# Patient Record
Sex: FEMALE | Race: BLACK OR AFRICAN AMERICAN | Employment: UNEMPLOYED | URBAN - METROPOLITAN AREA
[De-identification: names, ages, dates, MRNs, and addresses within clinical notes are randomized per-mention and may not be internally consistent; named-entity substitution may affect disease eponyms.]

---

## 2017-09-19 ENCOUNTER — OFFICE VISIT (OUTPATIENT)
Dept: INTERNAL MEDICINE CLINIC | Age: 47
End: 2017-09-19

## 2017-09-19 VITALS
DIASTOLIC BLOOD PRESSURE: 91 MMHG | WEIGHT: 124.2 LBS | HEART RATE: 94 BPM | TEMPERATURE: 98.5 F | RESPIRATION RATE: 18 BRPM | SYSTOLIC BLOOD PRESSURE: 115 MMHG | BODY MASS INDEX: 19.49 KG/M2 | OXYGEN SATURATION: 97 % | HEIGHT: 67 IN

## 2017-09-19 DIAGNOSIS — R26.9 GAIT DISORDER: Primary | ICD-10-CM

## 2017-09-19 DIAGNOSIS — M25.50 ARTHRALGIA, UNSPECIFIED JOINT: ICD-10-CM

## 2017-09-19 DIAGNOSIS — R63.4 WEIGHT LOSS: ICD-10-CM

## 2017-09-19 NOTE — PROGRESS NOTES
1. Have you been to the ER, urgent care clinic since your last visit? Hospitalized since your last visit? Yes Reason for visit: right knee    2. Have you seen or consulted any other health care providers outside of the [address] [place] [address] since your last visit? Include any pap smears or colon screening.  Yes Where: [facility]

## 2017-09-19 NOTE — MR AVS SNAPSHOT
Visit Information Date & Time Provider Department Dept. Phone Encounter #  
 9/19/2017  4:00 PM Juan Manuel Hawkins MD SPORTS MED AND PRIMARY CARE - Enocia Marker [phone] [id number] Upcoming Health Maintenance Date Due  
 PAP AKA CERVICAL CYTOLOGY 7/21/2018 DTaP/Tdap/Td series (2 - Td) 9/19/2027 Allergies as of 9/19/2017  Review Complete On: 9/19/2017 By: Juan Manuel Hawkins MD  
 No Known Allergies Current Immunizations  Reviewed on 1/6/2015 Name Date Influenza Vaccine 10/6/2014 Not reviewed this visit You Were Diagnosed With   
  
 Codes Comments Gait disorder    -  Primary ICD-10-CM: R26.9 ICD-9-CM: 032. 2 Weight loss     ICD-10-CM: R63.4 ICD-9-CM: 783.21 Arthralgia, unspecified joint     ICD-10-CM: M25.50 ICD-9-CM: 719.40 Vitals BP Pulse Temp Resp Height(growth percentile) Weight(growth percentile) (!) 115/91 (BP 1 Location: Left arm, BP Patient Position: Sitting) 94 98.5 °F (36.9 °C) (Oral) 18 5' 7\" (1.702 m) 124 lb 3.2 oz (56.3 kg) SpO2 BMI OB Status Smoking Status 97% 19.45 kg/m2 Injection Never Smoker Vitals History BMI and BSA Data Body Mass Index Body Surface Area  
 19.45 kg/m 2 1.63 m 2 Preferred Pharmacy Pharmacy Name Phone [id number] [phone] Your Updated Medication List  
  
   
This list is accurate as of: 9/19/17  4:41 PM.  Always use your most recent med list.  
  
  
  
  
 * benztropine 1 mg tablet Commonly known as:  COGENTIN Take 1 mg by mouth nightly. * benztropine 0.5 mg tablet Commonly known as:  COGENTIN Take 1 mg by mouth nightly as needed (Q HS). diazePAM 10 mg tablet Commonly known as:  VALIUM Take tab 45 minutes prior to MRI. Must have . * haloperidol 2 mg tablet Commonly known as:  HALDOL Take  by mouth nightly.  2 tabs qhs  
  
 * haloperidol 0.5 mg tablet Commonly known as:  HALDOL Take 2 mg by mouth nightly as needed (Q HS). * DEPO-PROVERA 150 mg/mL Syrg Generic drug:  medroxyPROGESTERone 150 mg by IntraMUSCular route once. Indications: EVERY 3 MONTHS * medroxyPROGESTERone 150 mg/mL Syrg Commonly known as:  DEPO-PROVERA * topiramate 50 mg tablet Commonly known as:  TOPAMAX Take 50 mg by mouth nightly. * TOPAMAX 50 mg tablet Generic drug:  topiramate Take 50 mg by mouth nightly as needed (Q HS). * traZODone 50 mg tablet Commonly known as:  Joy Hand Take  by mouth nightly. * traZODone 50 mg tablet Commonly known as:  Joy Hand Take 50 mg by mouth nightly. * Notice: This list has 10 medication(s) that are the same as other medications prescribed for you. Read the directions carefully, and ask your doctor or other care provider to review them with you. We Performed the Following CBC WITH AUTOMATED DIFF [62431 CPT(R)] HEMOGLOBIN A1C WITH EAG [76789 CPT(R)] LIPID PANEL [12876 CPT(R)] METABOLIC PANEL, COMPREHENSIVE [15901 CPT(R)] MA COLLECTION VENOUS BLOOD,VENIPUNCTURE [id number] CPT(R)] TSH 3RD GENERATION [64058 CPT(R)] URINALYSIS W/ RFLX MICROSCOPIC [59405 CPT(R)] Introducing \[facility]\"" & HEALTH SERVICES! [facility] introduces [employer] patient portal. Now you can access parts of your medical record, email your doctor's office, and request medication refills online. 1. In your internet browser, go to [email or web address] 2. Click on the First Time User? Click Here link in the Sign In box. You will see the New Member Sign Up page. 3. Enter your [employer] Access Code exactly as it appears below. You will not need to use this code after youve completed the sign-up process. If you do not sign up before the expiration date, you must request a new code. · [employer] Access Code: [email or web address] Expires: 12/18/2017  4:41 PM 
 
 4. Enter the last four digits of your Social Security Number (xxxx) and Date of Birth (mm/dd/yyyy) as indicated and click Submit. You will be taken to the next sign-up page. 5. Create a Quantitative Medicine ID. This will be your Quantitative Medicine login ID and cannot be changed, so think of one that is secure and easy to remember. 6. Create a Quantitative Medicine password. You can change your password at any time. 7. Enter your Password Reset Question and Answer. This can be used at a later time if you forget your password. 8. Enter your e-mail address. You will receive e-mail notification when new information is available in [address] 9. Click Sign Up. You can now view and download portions of your medical record. 10. Click the Download Summary menu link to download a portable copy of your medical information. If you have questions, please visit the Frequently Asked Questions section of the Quantitative [employer] website. Remember, [employer] is NOT to be used for urgent needs. For medical emergencies, dial 911. Now available from your iPhone and Android! Please provide this summary of care documentation to your next provider. Your primary care clinician is listed as Mario Alaniz. If you have any questions after today's visit, please call [phone].

## 2017-09-19 NOTE — PROGRESS NOTES
SPORTS MEDICINE AND PRIMARY CARE  Agueda Ayala MD, [address] [address]  Phone:  [phone]  Fax: [phone]      Chief Complaint   Patient presents with    Annual Exam         SUBECTIVE:    Doreen Ybarra is a 55 y.o. female Patient returns today alert, appropriate and has poor capacity to give an accurate history . She has a known history of Gait disorder, nystagmus, schizophrenia, bipolar, seizure disorder. She is seen for evaluation. Her caregiver, Tiburcio Peres, comes in with her today. There are no new concerns. Current Outpatient Prescriptions   Medication Sig Dispense Refill    medroxyPROGESTERone (DEPO-PROVERA) 150 mg/mL syrg 150 mg by IntraMUSCular route once. Indications: EVERY 3 MONTHS      haloperidol (HALDOL) 0.5 mg tablet Take 2 mg by mouth nightly as needed (Q HS).  traZODone (DESYREL) 50 mg tablet Take 50 mg by mouth nightly.  benztropine (COGENTIN) 0.5 mg tablet Take 1 mg by mouth nightly as needed (Q HS).  topiramate (TOPAMAX) 50 mg tablet Take 50 mg by mouth nightly as needed (Q HS).  medroxyPROGESTERone (DEPO-PROVERA) 150 mg/mL syrg       haloperidol (HALDOL) 2 mg tablet Take  by mouth nightly. 2 tabs qhs      traZODone (DESYREL) 50 mg tablet Take  by mouth nightly.  benztropine (COGENTIN) 1 mg tablet Take 1 mg by mouth nightly.  topiramate (TOPAMAX) 50 mg tablet Take 50 mg by mouth nightly.  diazepam (VALIUM) 10 mg tablet Take tab 45 minutes prior to MRI. Must have .  1 Tab 0       Past Medical History:   Diagnosis Date    Dental caries     FH: mental illness     Gait disorder     Nystagmus     primary gaze nystagmus    Psychiatric disorder     Schizophrenia  - Bipolar    Psychiatric disorder     Anxiety    Schizophrenia ([address]     Schizophrenia ([address]     undifferentiated    Seizures ([address]      Past Surgical History:   Procedure Laterality Date    HX OTHER SURGICAL      Dental Rehabilitation     No Known Allergies    REVIEW OF SYSTEMS:   Weight loss  concerned about weight gain. Social History     Social History    Marital status: SINGLE     Spouse name: N/A    Number of children: N/A    Years of education: N/A     Social History Main Topics    Smoking status: Never Smoker    Smokeless tobacco: Never Used    Alcohol use No    Drug use: No    Sexual activity: Not Asked     Other Topics Concern    None     Social History Narrative    ** Merged History Encounter **        r  Family History   Problem Relation Age of Onset    Heart Disease Mother     Heart Attack Mother     Hypertension Sister     Diabetes Sister        OBJECTIVE:  Visit Vitals    BP (!) 115/91 (BP 1 Location: Left arm, BP Patient Position: Sitting)    Pulse 94    Temp 98.5 °F (36.9 °C) (Oral)    Resp 18    Ht 5' 7\" (1.702 m)    Wt 124 lb 3.2 oz (56.3 kg)    SpO2 97%    BMI 19.45 kg/m2     ENT: perrla,  eom intact  NECK: supple. Thyroid normal  CHEST: clear to ascultation and percussion   HEART: regular rate and rhythm  ABD: soft, bowel sounds active  EXTREMITIES: no edema, pulse 1+     No visits with results within 3 Month(s) from this visit. Latest known visit with results is:    Office Visit on 06/23/2016   Component Date Value Ref Range Status    WBC 06/23/2016 6.0  3.4 - 10.8 x10E3/uL Final    RBC 06/23/2016 4.21  3.77 - 5.28 x10E6/uL Final    HGB 06/23/2016 13.5  11.1 - 15.9 g/dL Final    HCT 06/23/2016 38.8  34.0 - 46.6 % Final    MCV 06/23/2016 92  79 - 97 fL Final    MCH 06/23/2016 32.1  26.6 - 33.0 pg Final    MCHC 06/23/2016 34.8  31.5 - 35.7 g/dL Final    RDW 06/23/2016 13.3  12.3 - 15.4 % Final    PLATELET 32/57/3868 631  150 - 379 x10E3/uL Final    NEUTROPHILS 06/23/2016 42  % Final    Lymphocytes 06/23/2016 47  % Final    MONOCYTES 06/23/2016 8  % Final    EOSINOPHILS 06/23/2016 2  % Final    BASOPHILS 06/23/2016 1  % Final    ABS.  NEUTROPHILS 06/23/2016 2.5  1.4 - 7.0 x10E3/uL Final    Abs Lymphocytes 06/23/2016 2.9  0.7 - 3.1 x10E3/uL Final    ABS. MONOCYTES 06/23/2016 0.5  0.1 - 0.9 x10E3/uL Final    ABS. EOSINOPHILS 06/23/2016 0.1  0.0 - 0.4 x10E3/uL Final    ABS. BASOPHILS 06/23/2016 0.0  0.0 - 0.2 x10E3/uL Final    IMMATURE GRANULOCYTES 06/23/2016 0  % Final    ABS. IMM. GRANS. 06/23/2016 0.0  0.0 - 0.1 x10E3/uL Final    Glucose 06/23/2016 78  65 - 99 mg/dL Final    BUN 06/23/2016 8  6 - 24 mg/dL Final    Creatinine 06/23/2016 0.67  0.57 - 1.00 mg/dL Final    GFR est non-AA 06/23/2016 106  >59 mL/min/1.73 Final    GFR est AA 06/23/2016 123  >59 mL/min/1.73 Final    BUN/Creatinine ratio 06/23/2016 12  9 - 23 Final    Sodium 06/23/2016 140  134 - 144 mmol/L Final    Potassium 06/23/2016 4.4  3.5 - 5.2 mmol/L Final    Chloride 06/23/2016 103  97 - 108 mmol/L Final    CO2 06/23/2016 26  18 - 29 mmol/L Final    Calcium 06/23/2016 9.6  8.7 - 10.2 mg/dL Final    Protein, total 06/23/2016 7.5  6.0 - 8.5 g/dL Final    Albumin 06/23/2016 4.5  3.5 - 5.5 g/dL Final    GLOBULIN, TOTAL 06/23/2016 3.0  1.5 - 4.5 g/dL Final    A-G Ratio 06/23/2016 1.5  1.1 - 2.5 Final    Bilirubin, total 06/23/2016 0.3  0.0 - 1.2 mg/dL Final    Alk. phosphatase 06/23/2016 60  39 - 117 IU/L Final    AST (SGOT) 06/23/2016 21  0 - 40 IU/L Final    ALT (SGPT) 06/23/2016 16  0 - 32 IU/L Final    Cholesterol, total 06/23/2016 194  100 - 199 mg/dL Final    Triglyceride 06/23/2016 132  0 - 149 mg/dL Final    HDL Cholesterol 06/23/2016 44  >39 mg/dL Final    Comment: According to ATP-III Guidelines, HDL-C >59 mg/dL is considered a  negative risk factor for CHD.       VLDL, calculated 06/23/2016 26  5 - 40 mg/dL Final    LDL, calculated 06/23/2016 124* 0 - 99 mg/dL Final    TSH 06/23/2016 0.814  0.450 - 4.500 uIU/mL Final    Hemoglobin A1c 06/23/2016 5.3  4.8 - 5.6 % Final    Comment:          Pre-diabetes: 5.7 - 6.4           Diabetes: >6.4           Glycemic control for adults with diabetes: <7.0      Estimated average glucose 06/23/2016 105  mg/dL Final          ASSESSMENT:  1. Gait disorder    2. Weight loss    3. Arthralgia, unspecified joint      Little change in her condition since we last saw her. We were previously concerned about weight loss. That seems to have resolved. She's gained six pounds since we last saw her. Gait disorder has not changed. She has no arthralgias and specifically denies joint pain. Appropriate laboratory studies will be requested. She'll return to see us in one year, sooner if she has any problems. PLAN:  .  Orders Placed This Encounter    URINALYSIS W/ RFLX MICROSCOPIC    CBC WITH AUTOMATED DIFF    METABOLIC PANEL, COMPREHENSIVE    LIPID PANEL    TSH 3RD GENERATION    HEMOGLOBIN A1C WITH EAG       Follow-up Disposition:  Return in about 1 year (around 9/19/2018). ATTENTION:   This medical record was transcribed using an electronic medical records system. Although proofread, it may and can contain electronic and spelling errors. Other human spelling and other errors may be present. Corrections may be executed at a later time. Please feel free to contact us for any clarifications as needed.

## 2017-09-20 LAB
ALBUMIN SERPL-MCNC: 4.2 G/DL (ref 3.5–5.5)
ALBUMIN/GLOB SERPL: 1.2 {RATIO} (ref 1.2–2.2)
ALP SERPL-CCNC: 63 IU/L (ref 39–117)
ALT SERPL-CCNC: 14 IU/L (ref 0–32)
APPEARANCE UR: ABNORMAL
AST SERPL-CCNC: 15 IU/L (ref 0–40)
BACTERIA #/AREA URNS HPF: ABNORMAL /[HPF]
BASOPHILS # BLD AUTO: 0 X10E3/UL (ref 0–0.2)
BASOPHILS NFR BLD AUTO: 1 %
BILIRUB SERPL-MCNC: 0.2 MG/DL (ref 0–1.2)
BILIRUB UR QL STRIP: NEGATIVE
BUN SERPL-MCNC: 10 MG/DL (ref 6–24)
BUN/CREAT SERPL: 16 (ref 9–23)
CALCIUM SERPL-MCNC: 9.9 MG/DL (ref 8.7–10.2)
CASTS URNS QL MICRO: ABNORMAL /LPF
CHLORIDE SERPL-SCNC: 103 MMOL/L (ref 96–106)
CHOLEST SERPL-MCNC: 185 MG/DL (ref 100–199)
CO2 SERPL-SCNC: 25 MMOL/L (ref 18–29)
COLOR UR: YELLOW
CREAT SERPL-MCNC: 0.63 MG/DL (ref 0.57–1)
EOSINOPHIL # BLD AUTO: 0.1 X10E3/UL (ref 0–0.4)
EOSINOPHIL NFR BLD AUTO: 1 %
EPI CELLS #/AREA URNS HPF: ABNORMAL /HPF
ERYTHROCYTE [DISTWIDTH] IN BLOOD BY AUTOMATED COUNT: 13.5 % (ref 12.3–15.4)
EST. AVERAGE GLUCOSE BLD GHB EST-MCNC: 91 MG/DL
GLOBULIN SER CALC-MCNC: 3.5 G/DL (ref 1.5–4.5)
GLUCOSE SERPL-MCNC: 98 MG/DL (ref 65–99)
GLUCOSE UR QL: NEGATIVE
HBA1C MFR BLD: 4.8 % (ref 4.8–5.6)
HCT VFR BLD AUTO: 39.8 % (ref 34–46.6)
HDLC SERPL-MCNC: 36 MG/DL
HGB BLD-MCNC: 13.2 G/DL (ref 11.1–15.9)
HGB UR QL STRIP: NEGATIVE
IMM GRANULOCYTES # BLD: 0 X10E3/UL (ref 0–0.1)
IMM GRANULOCYTES NFR BLD: 0 %
KETONES UR QL STRIP: NEGATIVE
LDLC SERPL CALC-MCNC: 112 MG/DL (ref 0–99)
LEUKOCYTE ESTERASE UR QL STRIP: ABNORMAL
LYMPHOCYTES # BLD AUTO: 3.7 X10E3/UL (ref 0.7–3.1)
LYMPHOCYTES NFR BLD AUTO: 56 %
MCH RBC QN AUTO: 31.7 PG (ref 26.6–33)
MCHC RBC AUTO-ENTMCNC: 33.2 G/DL (ref 31.5–35.7)
MCV RBC AUTO: 95 FL (ref 79–97)
MICRO URNS: ABNORMAL
MONOCYTES # BLD AUTO: 0.8 X10E3/UL (ref 0.1–0.9)
MONOCYTES NFR BLD AUTO: 11 %
MUCOUS THREADS URNS QL MICRO: PRESENT
NEUTROPHILS # BLD AUTO: 2 X10E3/UL (ref 1.4–7)
NEUTROPHILS NFR BLD AUTO: 31 %
NITRITE UR QL STRIP: NEGATIVE
PH UR STRIP: 6.5 [PH] (ref 5–7.5)
PLATELET # BLD AUTO: 236 X10E3/UL (ref 150–379)
POTASSIUM SERPL-SCNC: 4.2 MMOL/L (ref 3.5–5.2)
PROT SERPL-MCNC: 7.7 G/DL (ref 6–8.5)
PROT UR QL STRIP: NEGATIVE
RBC # BLD AUTO: 4.17 X10E6/UL (ref 3.77–5.28)
RBC #/AREA URNS HPF: ABNORMAL /HPF
SODIUM SERPL-SCNC: 142 MMOL/L (ref 134–144)
SP GR UR: 1.01 (ref 1–1.03)
TRIGL SERPL-MCNC: 187 MG/DL (ref 0–149)
TSH SERPL DL<=0.005 MIU/L-ACNC: 1.22 UIU/ML (ref 0.45–4.5)
UROBILINOGEN UR STRIP-MCNC: 1 MG/DL (ref 0.2–1)
VLDLC SERPL CALC-MCNC: 37 MG/DL (ref 5–40)
WBC # BLD AUTO: 6.7 X10E3/UL (ref 3.4–10.8)
WBC #/AREA URNS HPF: ABNORMAL /HPF

## 2018-05-31 ENCOUNTER — OFFICE VISIT (OUTPATIENT)
Dept: INTERNAL MEDICINE CLINIC | Age: 48
End: 2018-05-31

## 2018-05-31 VITALS
OXYGEN SATURATION: 96 % | DIASTOLIC BLOOD PRESSURE: 82 MMHG | TEMPERATURE: 97.5 F | RESPIRATION RATE: 20 BRPM | HEART RATE: 98 BPM | SYSTOLIC BLOOD PRESSURE: 112 MMHG | WEIGHT: 124.9 LBS | HEIGHT: 67 IN | BODY MASS INDEX: 19.6 KG/M2

## 2018-05-31 DIAGNOSIS — F79 INTELLECTUAL DISABILITY: ICD-10-CM

## 2018-05-31 DIAGNOSIS — R26.9 GAIT DISORDER: ICD-10-CM

## 2018-05-31 DIAGNOSIS — F20.9 SCHIZOPHRENIA, UNSPECIFIED TYPE (HCC): ICD-10-CM

## 2018-05-31 DIAGNOSIS — H55.00 NYSTAGMUS: Primary | ICD-10-CM

## 2018-05-31 NOTE — PROGRESS NOTES
SPORTS MEDICINE AND PRIMARY CARE  Apolonio Aschoff, MD, [address] [address]  Phone:  [phone]  Fax: [phone]       Chief Complaint   Patient presents with    Physical   .      SUBJECTIVE:    Garfield Degroot is a 52 y.o. female Patient returns today with her sister, who gives most of the history. She lives with her sister, has been doing so since 2002. Patient herself has a known history of intellectual disability, , seizure disorder, gait disorder  for evaluation and for the . Current Outpatient Prescriptions   Medication Sig Dispense Refill    medroxyPROGESTERone (DEPO-PROVERA) 150 mg/mL syrg 150 mg by IntraMUSCular route once. Indications: EVERY 3 MONTHS      haloperidol (HALDOL) 0.5 mg tablet Take 2 mg by mouth nightly as needed (Q HS).  traZODone (DESYREL) 50 mg tablet Take 50 mg by mouth nightly.  benztropine (COGENTIN) 0.5 mg tablet Take 1 mg by mouth nightly as needed (Q HS).  topiramate (TOPAMAX) 50 mg tablet Take 50 mg by mouth nightly as needed (Q HS).        Past Medical History:   Diagnosis Date    Dental caries     FH: mental illness     Gait disorder     Intellectual disability     Nystagmus     primary gaze nystagmus    Psychiatric disorder     Schizophrenia  - Bipolar    Psychiatric disorder     Anxiety    [facility]ia ([facility]     Schizophrenia ([facility]     undifferentiated    Seizures ([facility]      Past Surgical History:   Procedure Laterality Date    HX OTHER SURGICAL      Dental Rehabilitation     No Known Allergies      REVIEW OF SYSTEMS:  General: negative for - chills or fever  ENT: negative for - headaches, nasal congestion or tinnitus  Respiratory: negative for - cough, hemoptysis, shortness of breath or wheezing  Cardiovascular : negative for - chest pain, edema, palpitations or shortness of breath  Gastrointestinal: negative for - abdominal pain, blood in stools, heartburn or nausea/vomiting  Genito-Urinary: no dysuria, trouble voiding, or hematuria  Musculoskeletal: negative for - gait disturbance, joint pain, joint stiffness or joint swelling  Neurological: no TIA or stroke symptoms  Hematologic: no bruises, no bleeding, no swollen glands  Integument: no lumps, mole changes, nail changes or rash  Endocrine: no malaise/lethargy or unexpected weight changes      Social History     Social History    Marital status: SINGLE     Spouse name: N/A    Number of children: N/A    Years of education: N/A     Social History Main Topics    Smoking status: Never Smoker    Smokeless tobacco: Never Used    Alcohol use No    Drug use: No    Sexual activity: Not Currently     Other Topics Concern    None     Social History Narrative    ** Merged History Encounter **     Family History: Mother:  hypertension, heart disease Father:  Sister(s): respiratory problems Brother(s): thyroid disease    Uncle: alive 10 brother(s) , 2 sister(s) . Social History: Alcohol Use Patient does not use alcohol. Smoking Status Patient is a never smoker. Marital Status: Single. Lives w ith:    Sister who works as caft manager for [employer] . Occupation/W ork: disabled. Education/School:  Lives with geeta wong sister since  [phone]    Goes to day support during the day [address]     Family History   Problem Relation Age of Onset    Heart Disease Mother     Heart Attack Mother     Hypertension Sister     Diabetes Sister        OBJECTIVE:    Visit Vitals    /82    Pulse 98    Temp 97.5 °F (36.4 °C) (Oral)    Resp 20    Ht 5' 7\" (1.702 m)    Wt 124 lb 14.4 oz (56.7 kg)    SpO2 96%    BMI 19.56 kg/m2     CONSTITUTIONAL: well , well nourished, appears age appropriate  EYES: perrla, eom intact  ENMT:moist mucous membranes, pharynx clear  NECK: supple.  Thyroid normal  RESPIRATORY: Chest: clear bilaterally   CARDIOVASCULAR: Heart: regular rate and rhythm  GASTROINTESTINAL: Abdomen: soft, bowel sounds active  HEMATOLOGIC: no pathological lymph nodes palpated  MUSCULOSKELETAL: Extremities: no edema, pulse 1+   INTEGUMENT: No unusual rashes or suspicious skin lesions noted. Nails appear normal.  NEUROLOGIC: non-focal exam   MENTAL STATUS: alert and oriented, appropriate affect           ASSESSMENT:  1. Nystagmus    2. Schizophrenia, unspecified type ([facility]    3. Intellectual disability    4. Gait disorder      Patient's medical status is stable. Patient continues to  behavioral health for mental illness, Dr. Suman Gaffney. Patient is approaching malnutrition and we encouraged PO intake, but actually her weight has not changed since her last visit. Her metabolic evaluation was performed in September which was unremarkable and we reviewed with her sister. She needs a test for TB, although has low risk for TB. Will do the Quantiferon and send her the results in the mail. She'll return to the office in six months or as needed. PLAN:  .  Orders Placed This Encounter    QUANTIFERON TB GOLD       Follow-up Disposition:  Return in about 6 months (around 11/30/2018). ATTENTION:   This medical record was transcribed using an electronic medical records system. Although proofread, it may and can contain electronic and spelling errors. Other human spelling and other errors may be present. Corrections may be executed at a later time. Please feel free to contact us for any clarifications as needed.

## 2018-05-31 NOTE — MR AVS SNAPSHOT
[address] [facility] [address] 
[phone] Patient: Kalpana Martin MRN: [id number] :1970 Visit Information Date & Time Provider Department Dept. Phone Encounter #  
 2018 12:00 PM Alexia Doty MD [facility] and [address] [id number] Follow-up Instructions Return in about 6 months (around 2018). Follow-up and Disposition History Your Appointments 2018  3:45 PM  
Any with Alexia Doty MD  
[address] [facility] ([facility]) Appt Note: yearly exam  
 [address], [place] [address] [facility] 98  
  
   
 [address], [facility] [address] [place] [address] Upcoming Health Maintenance Date Due  
 PAP AKA CERVICAL CYTOLOGY 2018 Influenza Age 5 to Adult 2018 DTaP/Tdap/Td series (2 - Td) 2027 Allergies as of 2018  Review Complete On: 2018 By: Alexia Doty MD  
 No Known Allergies Current Immunizations  Reviewed on 2015 Name Date Influenza Vaccine 10/6/2014 Not reviewed this visit You Were Diagnosed With   
  
 Codes Comments Nystagmus    -  Primary ICD-10-CM: H55.00 ICD-9-CM: 379.50 Schizophrenia, unspecified type ([facility]     ICD-10-CM: F20.9 ICD-9-CM: 295.90 Intellectual disability     ICD-10-CM: F79 
ICD-9-CM: 678 Gait disorder     ICD-10-CM: R26.9 ICD-9-CM: 297. 2 Vitals BP Pulse Temp Resp Height(growth percentile) Weight(growth percentile) 112/82 98 97.5 °F (36.4 °C) (Oral) 20 5' 7\" (1.702 m) 124 lb 14.4 oz (56.7 kg) SpO2 BMI OB Status Smoking Status 96% 19.56 kg/m2 Injection Never Smoker Vitals History BMI and BSA Data Body Mass Index Body Surface Area  
 19.56 kg/m 2 1.64 m 2 Preferred Pharmacy Pharmacy Name Phone [facility]/PHARMACY #[id number]- [facility] [address] 23 [phone] Your Updated Medication List  
  
   
This list is accurate as of 5/31/18  1:36 PM.  Always use your most recent med list.  
  
  
  
  
 benztropine 0.5 mg tablet Commonly known as:  COGENTIN Take 1 mg by mouth nightly as needed (Q HS). DEPO-PROVERA 150 mg/mL Syrg Generic drug:  medroxyPROGESTERone 150 mg by IntraMUSCular route once. Indications: EVERY 3 MONTHS  
  
 haloperidol 0.5 mg tablet Commonly known as:  HALDOL Take 2 mg by mouth nightly as needed (Q HS). TOPAMAX 50 mg tablet Generic drug:  topiramate Take 50 mg by mouth nightly as needed (Q HS). traZODone 50 mg tablet Commonly known as:  Rico Harrier Take 50 mg by mouth nightly. We Performed the Following QUANTIFERON TB GOLD [[id number] Custom] Follow-up Instructions Return in about 6 months (around 11/30/2018). Introducing [facility] & HEALTH SERVICES! [facility] introduces [employer] patient portal. Now you can access parts of your medical record, email your doctor's office, and request medication refills online. 1. In your internet browser, go to [email or web address] 2. Click on the First Time User? Click Here link in the Sign In box. You will see the New Member Sign Up page. 3. Enter your [employer] Access Code exactly as it appears below. You will not need to use this code after youve completed the sign-up process. If you do not sign up before the expiration date, you must request a new code. · [employer] Access Code: [email or web address] Expires: 8/29/2018  1:36 PM 
 
4. Enter the last four digits of your Social Security Number (xxxx) and Date of Birth (mm/dd/yyyy) as indicated and click Submit. You will be taken to the next sign-up page. 5. Create a EyeLock ID.  This will be your [employer]Lock login ID and cannot be changed, so think of one that is secure and easy to remember. 6. Create a Key Ring password. You can change your password at any time. 7. Enter your Password Reset Question and Answer. This can be used at a later time if you forget your password. 8. Enter your e-mail address. You will receive e-mail notification when new information is available in [address] 9. Click Sign Up. You can now view and download portions of your medical record. 10. Click the Download Summary menu link to download a portable copy of your medical information. If you have questions, please visit the Frequently Asked Questions section of the [employer] website. Remember, [employer] Ring is NOT to be used for urgent needs. For medical emergencies, dial 911. Now available from your iPhone and Android! Please provide this summary of care documentation to your next provider. Your primary care clinician is listed as Olivier Medrano. If you have any questions after today's visit, please call [phone].

## 2018-05-31 NOTE — PROGRESS NOTES
1. Have you been to the ER, urgent care clinic since your last visit? Hospitalized since your last visit? No    2. Have you seen or consulted any other health care providers outside of the [facility] since your last visit? Include any pap smears or colon screening.  No     Physical/ forms

## 2018-06-04 LAB
ANNOTATION COMMENT IMP: NORMAL
GAMMA INTERFERON BACKGROUND BLD IA-ACNC: 0.06 IU/ML
M TB IFN-G BLD-IMP: NEGATIVE
M TB IFN-G CD4+ BCKGRND COR BLD-ACNC: 0.06 IU/ML
M TB IFN-G CD4+ T-CELLS BLD-ACNC: 0.12 IU/ML
MITOGEN IGNF BLD-ACNC: 8.23 IU/ML
QUANTIFERON INCUBATION: NORMAL
SERVICE CMNT-IMP: NORMAL

## 2019-07-30 ENCOUNTER — OFFICE VISIT (OUTPATIENT)
Dept: INTERNAL MEDICINE CLINIC | Age: 49
End: 2019-07-30

## 2019-07-30 VITALS
OXYGEN SATURATION: 99 % | BODY MASS INDEX: 18.52 KG/M2 | DIASTOLIC BLOOD PRESSURE: 81 MMHG | HEART RATE: 88 BPM | TEMPERATURE: 97.2 F | SYSTOLIC BLOOD PRESSURE: 130 MMHG | WEIGHT: 118 LBS | HEIGHT: 67 IN | RESPIRATION RATE: 16 BRPM

## 2019-07-30 DIAGNOSIS — R63.4 WEIGHT LOSS: ICD-10-CM

## 2019-07-30 DIAGNOSIS — F79 INTELLECTUAL DISABILITY: ICD-10-CM

## 2019-07-30 DIAGNOSIS — R06.83 SNORING: ICD-10-CM

## 2019-07-30 DIAGNOSIS — R26.9 GAIT DISORDER: ICD-10-CM

## 2019-07-30 DIAGNOSIS — F20.9 SCHIZOPHRENIA, UNSPECIFIED TYPE (HCC): Primary | ICD-10-CM

## 2019-07-30 NOTE — PROGRESS NOTES
SPORTS MEDICINE AND PRIMARY CARE  Wolfgang Madrigal MD, [address] [address]  Phone:  [phone]  Fax: [phone]      Chief Complaint   Patient presents with    Physical         SUBECTIVE:    Nu Wynn is a 50 y.o. female Patient returns today with known history of schizophrenia, intellectual disability, gait disorder, weight loss, snoring, and is seen for evaluation. Patient returns today with her sister, Suma Vázquez, for a physical examination. She needs a report of tuberculin screening evaluation, as well as ____________ medical review and physical examination. She is requesting forms to be completed, which will be accomplished today. Patient is unable to give an accurate history. Her sister voices no new concerns for her. Current Outpatient Medications   Medication Sig Dispense Refill    medroxyPROGESTERone (DEPO-PROVERA) 150 mg/mL syrg 150 mg by IntraMUSCular route once. Indications: EVERY 3 MONTHS      haloperidol (HALDOL) 0.5 mg tablet Take 2 mg by mouth nightly as needed (Q HS).  traZODone (DESYREL) 50 mg tablet Take 50 mg by mouth nightly.  benztropine (COGENTIN) 0.5 mg tablet Take 1 mg by mouth nightly as needed (Q HS).  topiramate (TOPAMAX) 50 mg tablet Take 50 mg by mouth nightly as needed (Q HS). Past Medical History:   Diagnosis Date    Dental caries     FH: mental illness     Gait disorder     Intellectual disability     Nystagmus     primary gaze nystagmus    Psychiatric disorder     Schizophrenia  - Bipolar    Psychiatric disorder     Anxiety    Schizophrenia ([address]     Schizophrenia ([address]     undifferentiated    Seizures ([address]      Past Surgical History:   Procedure Laterality Date    HX OTHER SURGICAL      Dental Rehabilitation     No Known Allergies    REVIEW OF SYSTEMS:   Not reliably obtained.         Social History     Socioeconomic History    Marital status: SINGLE     Spouse name: Not on file    Number of children: Not on file    Years of education: Not on file    Highest education level: Not on file   Tobacco Use    Smoking status: Never Smoker    Smokeless tobacco: Never Used   Substance and Sexual Activity    Alcohol use: No     Alcohol/week: 0.0 standard drinks    Drug use: No    Sexual activity: Not Currently   Social History Narrative    ** Merged History Encounter **     Family History: Mother:  hypertension, heart disease Father:  Sister(s): respiratory problems Brother(s): thyroid disease    Uncle: alive 10 brother(s) , 2 sister(s) . Social History: Alcohol Use Patient does not use alcohol. Smoking Status Patient is a never smoker. Marital Status: Single. Lives w ith:    Sister who works as caft manager for [employer] . Occupation/W ork: disabled. Education/School:  Lives with geeta wong sister since  [phone]    Goes to day support during the day [address]   r  Family History   Problem Relation Age of Onset    Heart Disease Mother     Heart Attack Mother     Hypertension Sister     Diabetes Sister        OBJECTIVE:  Visit Vitals  /81   Pulse 88   Temp 97.2 °F (36.2 °C) (Oral)   Resp 16   Ht 5' 7\" (1.702 m)   Wt 118 lb (53.5 kg)   SpO2 99%   BMI 18.48 kg/m²     ENT: perrla,  eom intact  NECK: supple. Thyroid normal  CHEST: clear to ascultation and percussion   HEART: regular rate and rhythm  ABD: soft, bowel sounds active  EXTREMITIES: no edema, pulse 1+     No visits with results within 3 Month(s) from this visit. Latest known visit with results is:   Office Visit on 2018   Component Date Value Ref Range Status    QuantiFERON Incubation 2018    Final                    Value:Incubated, specimen forwarded to [place], [place] for  completion of the assay.  QuantiFERON TB Gold 2018 Negative  Negative Final    QUANTIFERON CRITERIA 2018 Comment   Final    Comment:  To be considered positive a specimen should have a TB Ag minus Nil  value greater than or equal to 0.35 IU/mL and in addition the TB Ag  minus Nil value must be greater than or equal to 25% of the Nil  value. There may be insufficient information in these values to  differentiate between some negative and some indeterminate test  values.  QuantiFERON TB Ag Value 05/31/2018 0.12  IU/mL Final    QuantiFERON Nil Value 05/31/2018 0.06  IU/mL Final    QuantiFERON Mitogen Value 05/31/2018 8.23  IU/mL Final    QFT TB Ag minus Nil Value 05/31/2018 0.06  IU/mL Final    Interpretation: 05/31/2018 Comment   Final    Comment: The QuantiFERON TB Gold (in Tube) assay is intended for use as an aid  in the diagnosis of TB infection. Negative results suggest that there  is no TB infection. In patients with high suspicion of exposure, a  negative test should be repeated. A positive test indicates infection  with Mycobacterium tuberculosis. Among individuals without  tuberculosis infection, a positive test may be due to exposure to  [place], M. szkirangai or M. marinum. On the Internet, go to  cdc.gov/tb for further details. ASSESSMENT:  1. Schizophrenia, unspecified type ([facility]    2. Intellectual disability    3. Gait disorder    4. Weight loss    5. Snoring      Patient's medical status is stable and forms are completed. He has a known history of schizophrenia and is followed by psychiatry. We will ______________ disability. Gait disorder is stable. She has had no falls. She does have 6 lbs of weight loss according to the scales, but on physical exam I do not find evidence to support that. With the snoring at night, the question of sleep apnea is raised. I am not sure she will use a CPAP machine and therefore _____________. Appropriate lab studies have been requested. She will be back to see us in six months, sooner if she has any problems. I have discussed the diagnosis with the patient and the intended plan as seen in the  orders above.   The patient understands and agees with the plan. The patient has   received an after visit summary and questions were answered concerning  future plans  Patient labs and/or xrays were reviewed  Past records were reviewed. PLAN:  .  Orders Placed This Encounter    QUANTIFERON-TB PLUS(CLIENT INCUB.)    URINALYSIS W/ RFLX MICROSCOPIC    CBC WITH AUTOMATED DIFF    METABOLIC PANEL, COMPREHENSIVE    LIPID PANEL    TSH 3RD GENERATION    HEMOGLOBIN A1C WITH EAG       Follow-up and Dispositions    · Return in about 6 months (around 1/30/2020). ATTENTION:   This medical record was transcribed using an electronic medical records system. Although proofread, it may and can contain electronic and spelling errors. Other human spelling and other errors may be present. Corrections may be executed at a later time. Please feel free to contact us for any clarifications as needed.

## 2019-07-30 NOTE — PROGRESS NOTES
Chief Complaint   Patient presents with    Physical     1. Have you been to the ER, urgent care clinic since your last visit? Hospitalized since your last visit? No    2. Have you seen or consulted any other health care providers outside of the [address] [place] [address] since your last visit? Include any pap smears or colon screening. No       Pt care taker would like to discuss if  tb test is needed.

## 2019-08-01 LAB
ALBUMIN SERPL-MCNC: 4.5 G/DL (ref 3.5–5.5)
ALBUMIN/GLOB SERPL: 1.5 {RATIO} (ref 1.2–2.2)
ALP SERPL-CCNC: 55 IU/L (ref 39–117)
ALT SERPL-CCNC: 14 IU/L (ref 0–32)
APPEARANCE UR: CLEAR
AST SERPL-CCNC: 12 IU/L (ref 0–40)
BASOPHILS # BLD AUTO: 0 X10E3/UL (ref 0–0.2)
BASOPHILS NFR BLD AUTO: 0 %
BILIRUB SERPL-MCNC: 0.4 MG/DL (ref 0–1.2)
BILIRUB UR QL STRIP: NEGATIVE
BUN SERPL-MCNC: 11 MG/DL (ref 6–24)
BUN/CREAT SERPL: 20 (ref 9–23)
CALCIUM SERPL-MCNC: 9.3 MG/DL (ref 8.7–10.2)
CHLORIDE SERPL-SCNC: 105 MMOL/L (ref 96–106)
CHOLEST SERPL-MCNC: 164 MG/DL (ref 100–199)
CO2 SERPL-SCNC: 22 MMOL/L (ref 20–29)
COLOR UR: YELLOW
CREAT SERPL-MCNC: 0.55 MG/DL (ref 0.57–1)
EOSINOPHIL # BLD AUTO: 0.1 X10E3/UL (ref 0–0.4)
EOSINOPHIL NFR BLD AUTO: 1 %
ERYTHROCYTE [DISTWIDTH] IN BLOOD BY AUTOMATED COUNT: 13.2 % (ref 12.3–15.4)
EST. AVERAGE GLUCOSE BLD GHB EST-MCNC: 97 MG/DL
GLOBULIN SER CALC-MCNC: 3.1 G/DL (ref 1.5–4.5)
GLUCOSE SERPL-MCNC: 91 MG/DL (ref 65–99)
GLUCOSE UR QL: NEGATIVE
HBA1C MFR BLD: 5 % (ref 4.8–5.6)
HCT VFR BLD AUTO: 37.8 % (ref 34–46.6)
HDLC SERPL-MCNC: 41 MG/DL
HGB BLD-MCNC: 13 G/DL (ref 11.1–15.9)
HGB UR QL STRIP: NEGATIVE
IMM GRANULOCYTES # BLD AUTO: 0 X10E3/UL (ref 0–0.1)
IMM GRANULOCYTES NFR BLD AUTO: 0 %
KETONES UR QL STRIP: NEGATIVE
LDLC SERPL CALC-MCNC: 93 MG/DL (ref 0–99)
LEUKOCYTE ESTERASE UR QL STRIP: NEGATIVE
LYMPHOCYTES # BLD AUTO: 3.2 X10E3/UL (ref 0.7–3.1)
LYMPHOCYTES NFR BLD AUTO: 55 %
MCH RBC QN AUTO: 31.3 PG (ref 26.6–33)
MCHC RBC AUTO-ENTMCNC: 34.4 G/DL (ref 31.5–35.7)
MCV RBC AUTO: 91 FL (ref 79–97)
MICRO URNS: NORMAL
MONOCYTES # BLD AUTO: 0.4 X10E3/UL (ref 0.1–0.9)
MONOCYTES NFR BLD AUTO: 7 %
NEUTROPHILS # BLD AUTO: 2.2 X10E3/UL (ref 1.4–7)
NEUTROPHILS NFR BLD AUTO: 37 %
NITRITE UR QL STRIP: NEGATIVE
PH UR STRIP: 6 [PH] (ref 5–7.5)
PLATELET # BLD AUTO: 244 X10E3/UL (ref 150–450)
POTASSIUM SERPL-SCNC: 3.7 MMOL/L (ref 3.5–5.2)
PROT SERPL-MCNC: 7.6 G/DL (ref 6–8.5)
PROT UR QL STRIP: NEGATIVE
RBC # BLD AUTO: 4.15 X10E6/UL (ref 3.77–5.28)
SODIUM SERPL-SCNC: 141 MMOL/L (ref 134–144)
SP GR UR: 1.02 (ref 1–1.03)
TRIGL SERPL-MCNC: 149 MG/DL (ref 0–149)
TSH SERPL DL<=0.005 MIU/L-ACNC: 1.36 UIU/ML (ref 0.45–4.5)
UROBILINOGEN UR STRIP-MCNC: 0.2 MG/DL (ref 0.2–1)
VLDLC SERPL CALC-MCNC: 30 MG/DL (ref 5–40)
WBC # BLD AUTO: 5.9 X10E3/UL (ref 3.4–10.8)

## 2019-08-03 LAB
GAMMA INTERFERON BACKGROUND BLD IA-ACNC: 0.03 IU/ML
M TB IFN-G BLD-IMP: NEGATIVE
M TB IFN-G CD4+ BCKGRND COR BLD-ACNC: 0.02 IU/ML
MITOGEN IGNF BLD-ACNC: 4.26 IU/ML
QUANTIFERON TB2 AG: 0.02 IU/ML
SERVICE CMNT-IMP: NORMAL

## 2020-01-28 ENCOUNTER — OFFICE VISIT (OUTPATIENT)
Dept: INTERNAL MEDICINE CLINIC | Age: 50
End: 2020-01-28

## 2020-01-28 VITALS
HEART RATE: 76 BPM | RESPIRATION RATE: 20 BRPM | WEIGHT: 121 LBS | DIASTOLIC BLOOD PRESSURE: 67 MMHG | BODY MASS INDEX: 18.99 KG/M2 | OXYGEN SATURATION: 97 % | HEIGHT: 67 IN | TEMPERATURE: 98.1 F | SYSTOLIC BLOOD PRESSURE: 112 MMHG

## 2020-01-28 DIAGNOSIS — F20.9 SCHIZOPHRENIA, UNSPECIFIED TYPE (HCC): ICD-10-CM

## 2020-01-28 DIAGNOSIS — F79 INTELLECTUAL DISABILITY: ICD-10-CM

## 2020-01-28 DIAGNOSIS — H55.00 NYSTAGMUS: ICD-10-CM

## 2020-01-28 DIAGNOSIS — R63.4 WEIGHT LOSS: Primary | ICD-10-CM

## 2020-01-28 DIAGNOSIS — R26.9 GAIT DISORDER: ICD-10-CM

## 2020-01-28 NOTE — ASSESSMENT & PLAN NOTE
This condition is managed by Specialist.  [facility] Meds             haloperidol (HALDOL) 0.5 mg tablet (Taking) Take 2 mg by mouth nightly as needed (Q HS). traZODone (DESYREL) 50 mg tablet (Taking) Take 50 mg by mouth nightly. Other [address] Meds             topiramate (TOPAMAX) 50 mg tablet (Taking) Take 50 mg by mouth nightly as needed (Q HS).         Lab Results   Component Value Date/Time    Sodium 141 07/30/2019 05:40 PM    Creatinine 0.55 07/30/2019 05:40 PM    TSH 1.360 07/30/2019 05:40 PM    WBC 5.9 07/30/2019 05:40 PM    ALT (SGPT) 14 07/30/2019 05:40 PM    AST (SGOT) 12 07/30/2019 05:40 PM

## 2020-01-28 NOTE — PROGRESS NOTES
Chief Complaint   Patient presents with    Weight Loss     follow up      1. Have you been to the ER, urgent care clinic since your last visit? Hospitalized since your last visit? No    2. Have you seen or consulted any other health care providers outside of the [address] [place] [address] since your last visit? Include any pap smears or colon screening.  No

## 2020-01-28 NOTE — PROGRESS NOTES
SPORTS MEDICINE AND PRIMARY CARE  Shree Huerta MD, [address] [address]  Phone:  [phone]  Fax: [phone]       Chief Complaint   Patient presents with    Weight Loss     follow up    . SUBJECTIVE:    Eamon Avalos is a 52 y.o. female Patient returns today with known history of weight loss, schizophrenia, nystagmus, intellectual disability, gait disorder, and is seen for evaluation. Patient returns today with her sister, Olaf Leone, who has been looking after her. She works during the day and takes her to day support during the day and is with her all night. She is not sleeping at night, which is causing the sister issues. Patient is seen for evaluation. Current Outpatient Medications   Medication Sig Dispense Refill    medroxyPROGESTERone (DEPO-PROVERA) 150 mg/mL syrg 150 mg by IntraMUSCular route once. Indications: EVERY 3 MONTHS      haloperidol (HALDOL) 0.5 mg tablet Take 2 mg by mouth nightly as needed (Q HS).  traZODone (DESYREL) 50 mg tablet Take 50 mg by mouth nightly.  benztropine (COGENTIN) 0.5 mg tablet Take 1 mg by mouth nightly as needed (Q HS).  topiramate (TOPAMAX) 50 mg tablet Take 50 mg by mouth nightly as needed (Q HS).        Past Medical History:   Diagnosis Date    Dental caries     FH: mental illness     Gait disorder     Intellectual disability     Nystagmus     primary gaze nystagmus    Psychiatric disorder     Schizophrenia  - Bipolar    Psychiatric disorder     Anxiety    [facility]     Schizophrenia ([facility]     undifferentiated    Seizures ([facility]      Past Surgical History:   Procedure Laterality Date    HX OTHER SURGICAL      Dental Rehabilitation     No Known Allergies      REVIEW OF SYSTEMS:  General: negative for - chills or fever  ENT: negative for - headaches, nasal congestion or tinnitus  Respiratory: negative for - cough, hemoptysis, shortness of breath or wheezing  Cardiovascular : negative for - chest pain, edema, palpitations or shortness of breath  Gastrointestinal: negative for - abdominal pain, blood in stools, heartburn or nausea/vomiting  Genito-Urinary: no dysuria, trouble voiding, or hematuria  Musculoskeletal: negative for - gait disturbance, joint pain, joint stiffness or joint swelling  Neurological: no TIA or stroke symptoms  Hematologic: no bruises, no bleeding, no swollen glands  Integument: no lumps, mole changes, nail changes or rash  Endocrine: no malaise/lethargy or unexpected weight changes      Social History     Socioeconomic History    Marital status: SINGLE     Spouse name: Not on file    Number of children: Not on file    Years of education: Not on file    Highest education level: Not on file   Tobacco Use    Smoking status: Never Smoker    Smokeless tobacco: Never Used   Substance and Sexual Activity    Alcohol use: No     Alcohol/week: 0.0 standard drinks    Drug use: No    Sexual activity: Not Currently   Social History Narrative    ** Merged History Encounter **     Family History: Mother:  hypertension, heart disease Father:  Sister(s): respiratory problems Brother(s): thyroid disease    Uncle: alive 10 brother(s) , 2 sister(s) . Social History: Alcohol Use Patient does not use alcohol. Smoking Status Patient is a never smoker. Marital Status: Single. Lives w ith:    Sister who works as caft manager for [employer] . Occupation/W ork: disabled.  Education/School:  Lives with geeta wong sister since  [phone]    Goes to day support during the day [address]     Family History   Problem Relation Age of Onset    Heart Disease Mother     Heart Attack Mother     Hypertension Sister     Diabetes Sister        OBJECTIVE:    Visit Vitals  /67   Pulse 76   Temp 98.1 °F (36.7 °C) (Oral)   Resp 20   Ht 5' 7\" (1.702 m)   Wt 121 lb (54.9 kg)   SpO2 97%   BMI 18.95 kg/m²     CONSTITUTIONAL: well , well nourished, appears age appropriate  EYES: perrla, eom intact  ENMT:moist mucous membranes, pharynx clear  NECK: supple. Thyroid normal  RESPIRATORY: Chest: clear bilaterally   CARDIOVASCULAR: Heart: regular rate and rhythm  GASTROINTESTINAL: Abdomen: soft, bowel sounds active  HEMATOLOGIC: no pathological lymph nodes palpated  MUSCULOSKELETAL: Extremities: no edema, pulse 1+   INTEGUMENT: No unusual rashes or suspicious skin lesions noted. Nails appear normal.  NEUROLOGIC: non-focal exam   MENTAL STATUS: alert and oriented, appropriate affect           ASSESSMENT:  1. Weight loss    2. Schizophrenia, unspecified type ([address]    3. Nystagmus    4. Intellectual disability    5. Gait disorder      Weight loss seems to have plateaued. Actually she has gained 3 pounds since we last saw her. We congratulate the sister. Known history of schizophrenia, is under the care of psychiatry. Nystagmus is unchanged, nor is intellectual disability, which is the reason for the  support and the sister's 24/7 care. Gait disorder seems to have stabilized likewise. She has an appointment to see Dr. Jessica Mei Thursday, who will arrange for her to have a pap smear and she is able to do light anesthesia. Patient will be back to see us in six months. At that time she will have her appropriate yearly laboratory studies and evaluation. I have discussed the diagnosis with the patient and the intended plan as seen in the  orders above. The patient understands and agees with the plan. The patient has   received an after visit summary and questions were answered concerning  future plans  Patient labs and/or xrays were reviewed  Past records were reviewed. PLAN:  . No orders of the defined types were placed in this encounter. Follow-up and Dispositions    · Return in about 6 months (around 7/28/2020). ATTENTION:   This medical record was transcribed using an electronic medical records system.   Although proofread, it may and can contain electronic and spelling errors. Other human spelling and other errors may be present. Corrections may be executed at a later time. Please feel free to contact us for any clarifications as needed.

## 2020-08-25 ENCOUNTER — OFFICE VISIT (OUTPATIENT)
Dept: INTERNAL MEDICINE CLINIC | Age: 50
End: 2020-08-25

## 2020-08-25 VITALS
OXYGEN SATURATION: 98 % | HEART RATE: 85 BPM | HEIGHT: 67 IN | BODY MASS INDEX: 18.54 KG/M2 | SYSTOLIC BLOOD PRESSURE: 134 MMHG | RESPIRATION RATE: 18 BRPM | TEMPERATURE: 98.1 F | WEIGHT: 118.1 LBS | DIASTOLIC BLOOD PRESSURE: 81 MMHG

## 2020-08-25 DIAGNOSIS — R26.9 GAIT DISORDER: ICD-10-CM

## 2020-08-25 DIAGNOSIS — F20.9 SCHIZOPHRENIA, UNSPECIFIED TYPE (HCC): ICD-10-CM

## 2020-08-25 DIAGNOSIS — F79 INTELLECTUAL DISABILITY: Primary | ICD-10-CM

## 2020-08-25 PROCEDURE — 99213 OFFICE O/P EST LOW 20 MIN: CPT | Performed by: INTERNAL MEDICINE

## 2020-08-25 PROCEDURE — 36415 COLL VENOUS BLD VENIPUNCTURE: CPT | Performed by: INTERNAL MEDICINE

## 2020-08-25 NOTE — PROGRESS NOTES
1. Have you been to the ER, urgent care clinic since your last visit? Hospitalized since your last visit? No    2. Have you seen or consulted any other health care providers outside of the [address] [place] [address] since your last visit? Include any pap smears or colon screening.  No         Needs a physical

## 2020-08-25 NOTE — PROGRESS NOTES
SPORTS MEDICINE AND PRIMARY CARE  Hong Pa MD, [address] [address]  Phone:  [phone]  Fax: [phone]       Chief Complaint   Patient presents with    Physical   .      SUBJECTIVE:    Lanre Mims is a 52 y.o. female Patient comes in today with her sister, Dottie Tavares, at [phone], with whom she stays. She just comes in for physical, has no new complaints. She has a known history of intellectual disability, schizophrenia, gait disorder and is seen for evaluation. The only other concern is that Fabrizio Mejia says that her sister eats, but is not gaining weight, which is not the case for her. Current Outpatient Medications   Medication Sig Dispense Refill    medroxyPROGESTERone (DEPO-PROVERA) 150 mg/mL syrg 150 mg by IntraMUSCular route once. Indications: EVERY 3 MONTHS      haloperidol (HALDOL) 0.5 mg tablet Take 2 mg by mouth nightly as needed (Q HS).  traZODone (DESYREL) 50 mg tablet Take 50 mg by mouth nightly.  benztropine (COGENTIN) 0.5 mg tablet Take 1 mg by mouth nightly as needed (Q HS).  topiramate (TOPAMAX) 50 mg tablet Take 50 mg by mouth nightly as needed (Q HS).        Past Medical History:   Diagnosis Date    Dental caries     FH: mental illness     Gait disorder     Intellectual disability     Nystagmus     primary gaze nystagmus    Psychiatric disorder     Schizophrenia  - Bipolar    Psychiatric disorder     Anxiety    [facility]     Schizophrenia ([facility]     undifferentiated    Seizures ([facility]      Past Surgical History:   Procedure Laterality Date    HX OTHER SURGICAL      Dental Rehabilitation     No Known Allergies      REVIEW OF SYSTEMS:  General: negative for - chills or fever  ENT: negative for - headaches, nasal congestion or tinnitus  Respiratory: negative for - cough, hemoptysis, shortness of breath or wheezing  Cardiovascular : negative for - chest pain, edema, palpitations or shortness of breath  Gastrointestinal: negative for - abdominal pain, blood in stools, heartburn or nausea/vomiting  Genito-Urinary: no dysuria, trouble voiding, or hematuria  Musculoskeletal: negative for - gait disturbance, joint pain, joint stiffness or joint swelling  Neurological: no TIA or stroke symptoms  Hematologic: no bruises, no bleeding, no swollen glands  Integument: no lumps, mole changes, nail changes or rash  Endocrine: no malaise/lethargy or unexpected weight changes      Social History     Socioeconomic History    Marital status: SINGLE     Spouse name: Not on file    Number of children: Not on file    Years of education: Not on file    Highest education level: Not on file   Tobacco Use    Smoking status: Never Smoker    Smokeless tobacco: Never Used   Substance and Sexual Activity    Alcohol use: No     Alcohol/week: 0.0 standard drinks    Drug use: No    Sexual activity: Not Currently   Social History Narrative    ** Merged History Encounter **     Family History: Mother:  hypertension, heart disease Father:  Sister(s): respiratory problems Brother(s): thyroid disease    Uncle: alive 10 brother(s) , 2 sister(s) . Social History: Alcohol Use Patient does not use alcohol. Smoking Status Patient is a never smoker. Marital Status: Single. Lives w ith:    Sister who works as caft manager for [employer] . Occupation/W ork: disabled.  Education/School:  Lives with geeta wong sister since  [phone]    Goes to day support during the day [address]     Family History   Problem Relation Age of Onset    Heart Disease Mother     Heart Attack Mother     Hypertension Sister     Diabetes Sister        OBJECTIVE:    Visit Vitals  /81   Pulse 85   Temp 98.1 °F (36.7 °C) (Oral)   Resp 18   Ht 5' 7\" (1.702 m)   Wt 118 lb 1.6 oz (53.6 kg)   SpO2 98%   BMI 18.50 kg/m²     CONSTITUTIONAL: well , well nourished, appears age appropriate  EYES: perrla, eom intact  ENMT:moist mucous membranes, pharynx clear  NECK: supple. Thyroid normal  RESPIRATORY: Chest: clear bilaterally   CARDIOVASCULAR: Heart: regular rate and rhythm  GASTROINTESTINAL: Abdomen: soft, bowel sounds active  HEMATOLOGIC: no pathological lymph nodes palpated  MUSCULOSKELETAL: Extremities: no edema, pulse 1+   INTEGUMENT: No unusual rashes or suspicious skin lesions noted. Nails appear normal.  NEUROLOGIC: non-focal exam   MENTAL STATUS: alert and oriented, appropriate affect           ASSESSMENT:  1. Intellectual disability    2. Schizophrenia, unspecified type ([address]    3. Gait disorder      Patient's medical status is stable. She is starting to go to the  every other day. Her sister notes that she is wearing the mask, in fact she wears it even in the car. We are concerned about her because of her outings with her  and it is so important that she wears a mask. She also has a hand , her sister notes. Schizophrenia is quiescent and not an issue. She has been on Haldol and Cogentin on a regular basis. Little change in the gait disorder. She remains on Topamax under the auspices of [address] [place] [address]. She will be back to see us in one year, sooner if she has any problems. Appropriate lab studies are ordered. He will be back to see us in one year, sooner if needed. Forms are completed for the [address], Medical Review and/or Physical Examination. She remains under the care of Dr. Jaleesa Givens for her pap smears and has her mammograms at [facility].      I have discussed the diagnosis with the patient and the intended plan as seen in the  orders above. The patient understands and agees with the plan. The patient has   received an after visit summary and questions were answered concerning  future plans  Patient labs and/or xrays were reviewed  Past records were reviewed. PLAN:  . No orders of the defined types were placed in this encounter. ATTENTION:   This medical record was transcribed using an electronic medical records system. Although proofread, it may and can contain electronic and spelling errors. Other human spelling and other errors may be present. Corrections may be executed at a later time. Please feel free to contact us for any clarifications as needed.

## 2020-08-26 LAB
ALBUMIN SERPL-MCNC: 4.5 G/DL (ref 3.8–4.8)
ALBUMIN/GLOB SERPL: 1.6 {RATIO} (ref 1.2–2.2)
ALP SERPL-CCNC: 64 IU/L (ref 39–117)
ALT SERPL-CCNC: 10 IU/L (ref 0–32)
APPEARANCE UR: CLEAR
AST SERPL-CCNC: 13 IU/L (ref 0–40)
BASOPHILS # BLD AUTO: 0 X10E3/UL (ref 0–0.2)
BASOPHILS NFR BLD AUTO: 1 %
BILIRUB SERPL-MCNC: 0.6 MG/DL (ref 0–1.2)
BILIRUB UR QL STRIP: NEGATIVE
BUN SERPL-MCNC: 11 MG/DL (ref 6–24)
BUN/CREAT SERPL: 15 (ref 9–23)
CALCIUM SERPL-MCNC: 9.8 MG/DL (ref 8.7–10.2)
CHLORIDE SERPL-SCNC: 105 MMOL/L (ref 96–106)
CHOLEST SERPL-MCNC: 187 MG/DL (ref 100–199)
CO2 SERPL-SCNC: 20 MMOL/L (ref 20–29)
COLOR UR: YELLOW
CREAT SERPL-MCNC: 0.72 MG/DL (ref 0.57–1)
EOSINOPHIL # BLD AUTO: 0 X10E3/UL (ref 0–0.4)
EOSINOPHIL NFR BLD AUTO: 1 %
ERYTHROCYTE [DISTWIDTH] IN BLOOD BY AUTOMATED COUNT: 12.2 % (ref 11.7–15.4)
EST. AVERAGE GLUCOSE BLD GHB EST-MCNC: 94 MG/DL
GLOBULIN SER CALC-MCNC: 2.9 G/DL (ref 1.5–4.5)
GLUCOSE SERPL-MCNC: 73 MG/DL (ref 65–99)
GLUCOSE UR QL: NEGATIVE
HBA1C MFR BLD: 4.9 % (ref 4.8–5.6)
HCT VFR BLD AUTO: 42.1 % (ref 34–46.6)
HDLC SERPL-MCNC: 39 MG/DL
HGB BLD-MCNC: 13.9 G/DL (ref 11.1–15.9)
HGB UR QL STRIP: NEGATIVE
IMM GRANULOCYTES # BLD AUTO: 0 X10E3/UL (ref 0–0.1)
IMM GRANULOCYTES NFR BLD AUTO: 0 %
KETONES UR QL STRIP: NEGATIVE
LDLC SERPL CALC-MCNC: 124 MG/DL (ref 0–99)
LEUKOCYTE ESTERASE UR QL STRIP: NEGATIVE
LYMPHOCYTES # BLD AUTO: 2.5 X10E3/UL (ref 0.7–3.1)
LYMPHOCYTES NFR BLD AUTO: 41 %
MCH RBC QN AUTO: 31.2 PG (ref 26.6–33)
MCHC RBC AUTO-ENTMCNC: 33 G/DL (ref 31.5–35.7)
MCV RBC AUTO: 94 FL (ref 79–97)
MICRO URNS: NORMAL
MONOCYTES # BLD AUTO: 0.5 X10E3/UL (ref 0.1–0.9)
MONOCYTES NFR BLD AUTO: 8 %
NEUTROPHILS # BLD AUTO: 3.1 X10E3/UL (ref 1.4–7)
NEUTROPHILS NFR BLD AUTO: 49 %
NITRITE UR QL STRIP: NEGATIVE
PH UR STRIP: 5 [PH] (ref 5–7.5)
PLATELET # BLD AUTO: 220 X10E3/UL (ref 150–450)
POTASSIUM SERPL-SCNC: 3.7 MMOL/L (ref 3.5–5.2)
PROT SERPL-MCNC: 7.4 G/DL (ref 6–8.5)
PROT UR QL STRIP: NEGATIVE
RBC # BLD AUTO: 4.46 X10E6/UL (ref 3.77–5.28)
SODIUM SERPL-SCNC: 142 MMOL/L (ref 134–144)
SP GR UR: 1.02 (ref 1–1.03)
TRIGL SERPL-MCNC: 120 MG/DL (ref 0–149)
TSH SERPL DL<=0.005 MIU/L-ACNC: 0.89 UIU/ML (ref 0.45–4.5)
UROBILINOGEN UR STRIP-MCNC: 0.2 MG/DL (ref 0.2–1)
VLDLC SERPL CALC-MCNC: 24 MG/DL (ref 5–40)
WBC # BLD AUTO: 6.3 X10E3/UL (ref 3.4–10.8)

## 2020-12-03 ENCOUNTER — OFFICE VISIT (OUTPATIENT)
Dept: URGENT CARE | Age: 50
End: 2020-12-03
Payer: MEDICAID

## 2020-12-03 VITALS — RESPIRATION RATE: 14 BRPM | OXYGEN SATURATION: 96 % | HEART RATE: 87 BPM | TEMPERATURE: 99.1 F

## 2020-12-03 DIAGNOSIS — Z20.822 EXPOSURE TO COVID-19 VIRUS: Primary | ICD-10-CM

## 2020-12-03 PROCEDURE — 99202 OFFICE O/P NEW SF 15 MIN: CPT | Performed by: FAMILY MEDICINE

## 2020-12-06 LAB — SARS-COV-2, NAA: NOT DETECTED

## 2021-07-12 NOTE — PROGRESS NOTES
This patient was seen at [address] [place] [address] [facility]gent Care while in their vehicle due to COVID-19 pandemic with PPE and focused examination in order to decrease community viral transmission. The patient/guardian gave verbal consent to treat. The history is provided by a caregiver. The history is limited by the condition of the patient.     asymptomatic  Some one at her day support tested positive for covid- she was asked to do test    Past Medical History:   Diagnosis Date    Dental caries     FH: mental illness     Gait disorder     Intellectual disability     Nystagmus     primary gaze nystagmus    Psychiatric disorder     Schizophrenia  - Bipolar    Psychiatric disorder     Anxiety    [facility]     Schizophrenia ([facility]     undifferentiated    Seizures ([facility]         Past Surgical History:   Procedure Laterality Date    HX OTHER SURGICAL      Dental Rehabilitation         Family History   Problem Relation Age of Onset    Heart Disease Mother     Heart Attack Mother     Hypertension Sister     Diabetes Sister         Social History     Socioeconomic History    Marital status: SINGLE     Spouse name: Not on file    Number of children: Not on file    Years of education: Not on file    Highest education level: Not on file   Occupational History    Not on file   Social Needs    Financial resource strain: Not on file    Food insecurity     Worry: Not on file     Inability: Not on file    Transportation needs     Medical: Not on file     Non-medical: Not on file   Tobacco Use    Smoking status: Never Smoker    Smokeless tobacco: Never Used   Substance and Sexual Activity    Alcohol use: No     Alcohol/week: 0.0 standard drinks    Drug use: No    Sexual activity: Not Currently   Lifestyle    Physical activity     Days per week: Not on file     Minutes per session: Not on file    Stress: Not on file   Relationships    Social connections     Talks on phone: Not on file     Gets together: Not on file     Attends Rastafari service: Not on file     Active member of club or organization: Not on file     Attends meetings of clubs or organizations: Not on file     Relationship status: Not on file    Intimate partner violence     Fear of current or ex partner: Not on file     Emotionally abused: Not on file     Physically abused: Not on file     Forced sexual activity: Not on file   Other Topics Concern    Not on file   Social History Narrative    ** Merged History Encounter **     Family History: Mother:  hypertension, heart disease Father:  Sister(s): respiratory problems Brother(s): thyroid disease    Uncle: alive 10 brother(s) , 2 sister(s) . Social History: Alcohol Use Patient does not use alcohol. Smoking Status Patient is a never smoker. Marital Status: Single. Lives w ith:    Sister who works as caft manager for [employer] . Occupation/W ork: disabled. Education/School:  Lives with geeta wong sister since  [phone]    Goes to day support during the day Diamond: Patient has no known allergies. Review of Systems   All other systems reviewed and are negative. Vitals:    20 1537   Pulse: 87   Resp: 14   Temp: 99.1 °F (37.3 °C)   SpO2: 96%       Physical Exam  Vitals signs and nursing note reviewed. Constitutional:       General: She is not in acute distress. Appearance: She is not ill-appearing. Pulmonary:      Effort: Pulmonary effort is normal. No respiratory distress. Breath sounds: No wheezing. MDM    Procedures        ICD-10-CM ICD-9-CM    1. Exposure to COVID-19 virus  Z20.828 V01.79 NOVEL CORONAVIRUS (COVID-19)     No orders of the defined types were placed in this encounter. No results found for any visits on 20. The patients condition was discussed with the patient and they understand. The patient is to follow up with primary care doctor.   If signs and symptoms become worse the pt is to go to the ER. The patient is to take medications as prescribed. Yes

## 2022-03-31 ENCOUNTER — OFFICE VISIT (OUTPATIENT)
Dept: INTERNAL MEDICINE CLINIC | Age: 52
End: 2022-03-31
Payer: MEDICAID

## 2022-03-31 VITALS
OXYGEN SATURATION: 95 % | TEMPERATURE: 98.5 F | SYSTOLIC BLOOD PRESSURE: 130 MMHG | DIASTOLIC BLOOD PRESSURE: 70 MMHG | HEART RATE: 95 BPM | BODY MASS INDEX: 19.19 KG/M2 | HEIGHT: 67 IN | WEIGHT: 122.3 LBS | RESPIRATION RATE: 16 BRPM

## 2022-03-31 DIAGNOSIS — N39.42 URINARY INCONTINENCE WITHOUT SENSORY AWARENESS: ICD-10-CM

## 2022-03-31 DIAGNOSIS — F20.9 SCHIZOPHRENIA, UNSPECIFIED TYPE (HCC): ICD-10-CM

## 2022-03-31 DIAGNOSIS — R15.9 FULL INCONTINENCE OF FECES: ICD-10-CM

## 2022-03-31 DIAGNOSIS — Z12.11 SCREEN FOR COLON CANCER: ICD-10-CM

## 2022-03-31 DIAGNOSIS — F79 INTELLECTUAL DISABILITY: ICD-10-CM

## 2022-03-31 DIAGNOSIS — R26.9 GAIT DISORDER: ICD-10-CM

## 2022-03-31 DIAGNOSIS — R56.9 SEIZURES (HCC): Primary | ICD-10-CM

## 2022-03-31 PROCEDURE — 99214 OFFICE O/P EST MOD 30 MIN: CPT | Performed by: INTERNAL MEDICINE

## 2022-03-31 PROCEDURE — 36415 COLL VENOUS BLD VENIPUNCTURE: CPT | Performed by: INTERNAL MEDICINE

## 2022-03-31 NOTE — PROGRESS NOTES
SPORTS MEDICINE AND PRIMARY CARE  Jp Neely MD, [address] [address]  Phone:  [phone]  Fax: [phone]       Chief Complaint   Patient presents with    Medication Refill   . SUBJECTIVE:    Abdi Small is a 46 y.o. female Patient returns today with a known history of intellectual disability, schizophrenia, seizure disorder, gait disorder, and is seen for evaluation. Patient comes in today with her sister, Kami Roe. [employer] has sent us a form so she can continue to get her incontinence supplies. She is incontinent of bowel and bladder due to sensory unawareness. Her walking is slightly worse, her sister notes. The longer she walks, she notes she gets tired very quickly. Patient continues to go to [facility] . She continues to have mammograms and they did an ultrasound of her breasts recently that apparently came back negative. Sister does not think she will get her to drink her Golytely and wonders if we can do a Cologuard for cancer screening. Patient is seen for evaluation. Current Outpatient Medications   Medication Sig Dispense Refill    medroxyPROGESTERone (DEPO-PROVERA) 150 mg/mL syrg 150 mg by IntraMUSCular route once. Indications: EVERY 3 MONTHS      haloperidol (HALDOL) 0.5 mg tablet Take 2 mg by mouth nightly as needed (Q HS).  traZODone (DESYREL) 50 mg tablet Take 50 mg by mouth nightly.  benztropine (COGENTIN) 0.5 mg tablet Take 1 mg by mouth nightly as needed (Q HS).  topiramate (TOPAMAX) 50 mg tablet Take 50 mg by mouth nightly as needed (Q HS).        Past Medical History:   Diagnosis Date    Dental caries     FH: mental illness     Gait disorder     Incontinence     Intellectual disability     Nystagmus     primary gaze nystagmus    Psychiatric disorder     Schizophrenia  - Bipolar    Psychiatric disorder     Anxiety    [facility]ia ([facility]     Schizophrenia ([facility]     undifferentiated    Seizures ([facility]      Past Surgical History:   Procedure Laterality Date    HX OTHER SURGICAL      Dental Rehabilitation     No Known Allergies      REVIEW OF SYSTEMS:  General: negative for - chills or fever  ENT: negative for - headaches, nasal congestion or tinnitus  Respiratory: negative for - cough, hemoptysis, shortness of breath or wheezing  Cardiovascular : negative for - chest pain, edema, palpitations or shortness of breath  Gastrointestinal: negative for - abdominal pain, blood in stools, heartburn or nausea/vomiting  Genito-Urinary: no dysuria, trouble voiding, or hematuria  Musculoskeletal: negative for - gait disturbance, joint pain, joint stiffness or joint swelling  Neurological: no TIA or stroke symptoms  Hematologic: no bruises, no bleeding, no swollen glands  Integument: no lumps, mole changes, nail changes or rash  Endocrine: no malaise/lethargy or unexpected weight changes      Social History     Socioeconomic History    Marital status: SINGLE   Tobacco Use    Smoking status: Never Smoker    Smokeless tobacco: Never Used   Substance and Sexual Activity    Alcohol use: No     Alcohol/week: 0.0 standard drinks    Drug use: No    Sexual activity: Not Currently   Social History Narrative    ** Merged History Encounter **     Family History: Mother:  hypertension, heart disease Father:  Sister(s): respiratory problems Brother(s): thyroid disease    Uncle: alive 10 brother(s) , 2 sister(s) . Social History: Alcohol Use Patient does not use alcohol. Smoking Status Patient is a never smoker. Marital Status: Single. Lives w ith:    Sister who works as caft manager for [employer] . Occupation/W ork: disabled.  Education/School:  Lives with geeta wong sister since  [phone]    Goes to day support during the day [address]     Family History   Problem Relation Age of Onset    Heart Disease Mother     Heart Attack Mother     Hypertension Sister     Diabetes Sister OBJECTIVE:    Visit Vitals  /70 (BP 1 Location: Left upper arm, BP Patient Position: Sitting)   Pulse 95   Temp 98.5 °F (36.9 °C) (Oral)   Resp 16   Ht 5' 7\" (1.702 m)   Wt 122 lb 4.8 oz (55.5 kg)   SpO2 95%   BMI 19.15 kg/m²     CONSTITUTIONAL: well , well nourished, appears age appropriate  EYES: perrla, eom intact  ENMT:moist mucous membranes, pharynx clear  NECK: supple. Thyroid normal  RESPIRATORY: Chest: clear bilaterally   CARDIOVASCULAR: Heart: regular rate and rhythm  GASTROINTESTINAL: Abdomen: soft, bowel sounds active  HEMATOLOGIC: no pathological lymph nodes palpated  MUSCULOSKELETAL: Extremities: no edema, pulse 1+   INTEGUMENT: No unusual rashes or suspicious skin lesions noted. Nails appear normal.  NEUROLOGIC: non-focal exam   MENTAL STATUS: alert and oriented, appropriate affect           ASSESSMENT:  1. Seizures ([address]    2. Intellectual disability    3. Schizophrenia, unspecified type ([address]    4. Gait disorder    5. Screen for colon cancer    6. Urinary incontinence without sensory awareness    7. Full incontinence of feces      No seizures since we last saw her. Intellectual disability continues and fortunately she has a sister who is completely devoted to her, Sanjuanita Charles. She has schizophrenia and has a new psychiatrist.  She is going to have a telehealth visit with her next month. Gait disorder, as would be expected, is getting worse, however she is still ambulatory. Sister does not think she will cooperate enough to have the prep for the colonoscopy and therefore we will request a Cologuard. She has incontinence of bowel and bladder without sensory awareness. The amount is large and the frequency is frequent. For those reasons, she needs extra diapers. I have discussed the diagnosis with the patient and the intended plan as seen in the  Orders. The patient understands and agees with the plan.   The patient has   received an after visit summary and questions were answered concerning  future plans  Patient labs and/or xrays were reviewed  Past records were reviewed. PLAN:  .  Orders Placed This Encounter    AMB SUPPLY ORDER    CBC WITH AUTOMATED DIFF    METABOLIC PANEL, COMPREHENSIVE    LIPID PANEL    TSH 3RD GENERATION    HEMOGLOBIN A1C WITH EAG    COLOGUARD TEST (FECAL DNA COLORECTAL CANCER SCREENING)       Follow-up and Dispositions    · Return in about 1 year (around 3/31/2023). ATTENTION:   This medical record was transcribed using an electronic medical records system. Although proofread, it may and can contain electronic and spelling errors. Other human spelling and other errors may be present. Corrections may be executed at a later time. Please feel free to contact us for any clarifications as needed.

## 2022-03-31 NOTE — PROGRESS NOTES
Kelli Plascencia is a 46 y.o. female    Chief Complaint   Patient presents with    Medication Refill     1. Have you been to the ER, urgent care clinic since your last visit? Hospitalized since your last visit? No      2. Have you seen or consulted any other health care providers outside of the [address] [place] [address] since your last visit? Include any pap smears or colon screening.  No

## 2022-04-01 LAB
ALBUMIN SERPL-MCNC: 4.3 G/DL (ref 3.8–4.9)
ALBUMIN/GLOB SERPL: 1.3 {RATIO} (ref 1.2–2.2)
ALP SERPL-CCNC: 71 IU/L (ref 44–121)
ALT SERPL-CCNC: 10 IU/L (ref 0–32)
AST SERPL-CCNC: 14 IU/L (ref 0–40)
BASOPHILS # BLD AUTO: 0 X10E3/UL (ref 0–0.2)
BASOPHILS NFR BLD AUTO: 1 %
BILIRUB SERPL-MCNC: 0.3 MG/DL (ref 0–1.2)
BUN SERPL-MCNC: 9 MG/DL (ref 6–24)
BUN/CREAT SERPL: 15 (ref 9–23)
CALCIUM SERPL-MCNC: 9.4 MG/DL (ref 8.7–10.2)
CHLORIDE SERPL-SCNC: 105 MMOL/L (ref 96–106)
CHOLEST SERPL-MCNC: 170 MG/DL (ref 100–199)
CO2 SERPL-SCNC: 22 MMOL/L (ref 20–29)
CREAT SERPL-MCNC: 0.6 MG/DL (ref 0.57–1)
EGFR: 109 ML/MIN/1.73
EOSINOPHIL # BLD AUTO: 0.1 X10E3/UL (ref 0–0.4)
EOSINOPHIL NFR BLD AUTO: 2 %
ERYTHROCYTE [DISTWIDTH] IN BLOOD BY AUTOMATED COUNT: 11.9 % (ref 11.7–15.4)
EST. AVERAGE GLUCOSE BLD GHB EST-MCNC: 105 MG/DL
GLOBULIN SER CALC-MCNC: 3.3 G/DL (ref 1.5–4.5)
GLUCOSE SERPL-MCNC: 87 MG/DL (ref 65–99)
HBA1C MFR BLD: 5.3 % (ref 4.8–5.6)
HCT VFR BLD AUTO: 41.4 % (ref 34–46.6)
HDLC SERPL-MCNC: 39 MG/DL
HGB BLD-MCNC: 13.8 G/DL (ref 11.1–15.9)
IMM GRANULOCYTES # BLD AUTO: 0 X10E3/UL (ref 0–0.1)
IMM GRANULOCYTES NFR BLD AUTO: 0 %
LDLC SERPL CALC-MCNC: 112 MG/DL (ref 0–99)
LYMPHOCYTES # BLD AUTO: 2.8 X10E3/UL (ref 0.7–3.1)
LYMPHOCYTES NFR BLD AUTO: 46 %
MCH RBC QN AUTO: 31.2 PG (ref 26.6–33)
MCHC RBC AUTO-ENTMCNC: 33.3 G/DL (ref 31.5–35.7)
MCV RBC AUTO: 94 FL (ref 79–97)
MONOCYTES # BLD AUTO: 0.6 X10E3/UL (ref 0.1–0.9)
MONOCYTES NFR BLD AUTO: 10 %
NEUTROPHILS # BLD AUTO: 2.4 X10E3/UL (ref 1.4–7)
NEUTROPHILS NFR BLD AUTO: 41 %
PLATELET # BLD AUTO: 237 X10E3/UL (ref 150–450)
POTASSIUM SERPL-SCNC: 4.2 MMOL/L (ref 3.5–5.2)
PROT SERPL-MCNC: 7.6 G/DL (ref 6–8.5)
RBC # BLD AUTO: 4.42 X10E6/UL (ref 3.77–5.28)
SODIUM SERPL-SCNC: 141 MMOL/L (ref 134–144)
TRIGL SERPL-MCNC: 106 MG/DL (ref 0–149)
TSH SERPL DL<=0.005 MIU/L-ACNC: 0.93 UIU/ML (ref 0.45–4.5)
VLDLC SERPL CALC-MCNC: 19 MG/DL (ref 5–40)
WBC # BLD AUTO: 6 X10E3/UL (ref 3.4–10.8)

## 2023-04-03 ENCOUNTER — OFFICE VISIT (OUTPATIENT)
Dept: INTERNAL MEDICINE CLINIC | Age: 53
End: 2023-04-03
Payer: MEDICAID

## 2023-04-03 DIAGNOSIS — R56.9 SEIZURES (HCC): Primary | ICD-10-CM

## 2023-04-03 DIAGNOSIS — E78.5 DYSLIPIDEMIA: ICD-10-CM

## 2023-04-03 DIAGNOSIS — N39.42 URINARY INCONTINENCE WITHOUT SENSORY AWARENESS: ICD-10-CM

## 2023-04-03 DIAGNOSIS — F20.9 SCHIZOPHRENIA, UNSPECIFIED TYPE (HCC): ICD-10-CM

## 2023-04-03 DIAGNOSIS — R26.9 GAIT DISORDER: ICD-10-CM

## 2023-04-03 DIAGNOSIS — Z12.11 SCREEN FOR COLON CANCER: ICD-10-CM

## 2023-04-03 PROCEDURE — 99214 OFFICE O/P EST MOD 30 MIN: CPT | Performed by: INTERNAL MEDICINE

## 2023-04-03 RX ORDER — HYDROXYZINE 50 MG/1
TABLET, FILM COATED ORAL
COMMUNITY
Start: 2023-03-29
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
## 2023-04-03 NOTE — PROGRESS NOTES
Micaela Perez is a 46 y.o. female    Chief Complaint   Patient presents with    Complete Physical     1. Have you been to the ER, urgent care clinic since your last visit? Hospitalized since your last visit? No    2. Have you seen or consulted any other health care providers outside of the [address] [place] [address] since your last visit? Include any pap smears or colon screening.  No

## 2023-04-05 ENCOUNTER — TRANSCRIBE ORDER (OUTPATIENT)
Dept: SCHEDULING | Age: 53
End: 2023-04-05

## 2023-04-05 LAB
ALBUMIN SERPL-MCNC: 4.3 G/DL (ref 3.8–4.9)
ALBUMIN/GLOB SERPL: 1.4 {RATIO} (ref 1.2–2.2)
ALP SERPL-CCNC: 66 IU/L (ref 44–121)
ALT SERPL-CCNC: 9 IU/L (ref 0–32)
APPEARANCE UR: CLEAR
AST SERPL-CCNC: 11 IU/L (ref 0–40)
BASOPHILS # BLD AUTO: 0 X10E3/UL (ref 0–0.2)
BASOPHILS NFR BLD AUTO: 1 %
BILIRUB SERPL-MCNC: 0.3 MG/DL (ref 0–1.2)
BILIRUB UR QL STRIP: NEGATIVE
BUN SERPL-MCNC: 12 MG/DL (ref 6–24)
BUN/CREAT SERPL: 16 (ref 9–23)
CALCIUM SERPL-MCNC: 9.1 MG/DL (ref 8.7–10.2)
CHLORIDE SERPL-SCNC: 110 MMOL/L (ref 96–106)
CHOLEST SERPL-MCNC: 172 MG/DL (ref 100–199)
CO2 SERPL-SCNC: 21 MMOL/L (ref 20–29)
COLOR UR: YELLOW
CREAT SERPL-MCNC: 0.74 MG/DL (ref 0.57–1)
EGFRCR SERPLBLD CKD-EPI 2021: 97 ML/MIN/1.73
EOSINOPHIL # BLD AUTO: 0.1 X10E3/UL (ref 0–0.4)
EOSINOPHIL NFR BLD AUTO: 1 %
ERYTHROCYTE [DISTWIDTH] IN BLOOD BY AUTOMATED COUNT: 12 % (ref 11.7–15.4)
GLOBULIN SER CALC-MCNC: 3.1 G/DL (ref 1.5–4.5)
GLUCOSE SERPL-MCNC: 101 MG/DL (ref 70–99)
GLUCOSE UR QL STRIP: NEGATIVE
HCT VFR BLD AUTO: 39.3 % (ref 34–46.6)
HCV IGG SERPL QL IA: NON REACTIVE
HDLC SERPL-MCNC: 38 MG/DL
HGB BLD-MCNC: 13 G/DL (ref 11.1–15.9)
HGB UR QL STRIP: NEGATIVE
IMM GRANULOCYTES # BLD AUTO: 0 X10E3/UL (ref 0–0.1)
IMM GRANULOCYTES NFR BLD AUTO: 0 %
KETONES UR QL STRIP: NEGATIVE
LDLC SERPL CALC-MCNC: 114 MG/DL (ref 0–99)
LEUKOCYTE ESTERASE UR QL STRIP: NEGATIVE
LYMPHOCYTES # BLD AUTO: 2.9 X10E3/UL (ref 0.7–3.1)
LYMPHOCYTES NFR BLD AUTO: 47 %
MCH RBC QN AUTO: 30.5 PG (ref 26.6–33)
MCHC RBC AUTO-ENTMCNC: 33.1 G/DL (ref 31.5–35.7)
MCV RBC AUTO: 92 FL (ref 79–97)
MICRO URNS: NORMAL
MONOCYTES # BLD AUTO: 0.5 X10E3/UL (ref 0.1–0.9)
MONOCYTES NFR BLD AUTO: 8 %
NEUTROPHILS # BLD AUTO: 2.6 X10E3/UL (ref 1.4–7)
NEUTROPHILS NFR BLD AUTO: 43 %
NITRITE UR QL STRIP: NEGATIVE
PH UR STRIP: 6.5 [PH] (ref 5–7.5)
PLATELET # BLD AUTO: 236 X10E3/UL (ref 150–450)
POTASSIUM SERPL-SCNC: 3.9 MMOL/L (ref 3.5–5.2)
PROT SERPL-MCNC: 7.4 G/DL (ref 6–8.5)
PROT UR QL STRIP: NEGATIVE
RBC # BLD AUTO: 4.26 X10E6/UL (ref 3.77–5.28)
SODIUM SERPL-SCNC: 143 MMOL/L (ref 134–144)
SP GR UR STRIP: 1.02 (ref 1–1.03)
TRIGL SERPL-MCNC: 110 MG/DL (ref 0–149)
TSH SERPL DL<=0.005 MIU/L-ACNC: 1.17 UIU/ML (ref 0.45–4.5)
UROBILINOGEN UR STRIP-MCNC: 0.2 MG/DL (ref 0.2–1)
VLDLC SERPL CALC-MCNC: 20 MG/DL (ref 5–40)
WBC # BLD AUTO: 6 X10E3/UL (ref 3.4–10.8)

## 2023-04-20 ENCOUNTER — PATIENT MESSAGE (OUTPATIENT)
Dept: INTERNAL MEDICINE CLINIC | Age: 53
End: 2023-04-20

## 2023-04-23 DIAGNOSIS — Z12.31 SCREENING MAMMOGRAM FOR HIGH-RISK PATIENT: Primary | ICD-10-CM

## 2023-04-23 DIAGNOSIS — F20.9 SCHIZOPHRENIA, UNSPECIFIED TYPE (HCC): ICD-10-CM

## 2023-04-23 DIAGNOSIS — R56.9 SEIZURES (HCC): Primary | ICD-10-CM

## 2023-08-24 ENCOUNTER — TELEPHONE (OUTPATIENT)
Facility: CLINIC | Age: 53
End: 2023-08-24

## 2023-08-24 NOTE — TELEPHONE ENCOUNTER
I attempted to call patient to schedule mammogram   [phone] (home)  not in service  [phone] someone answer but wont let me talk to patient.  Will send message via my chart

## 2023-12-15 ENCOUNTER — OFFICE VISIT (OUTPATIENT)
Facility: CLINIC | Age: 53
End: 2023-12-15
Payer: MEDICARE

## 2023-12-15 VITALS
SYSTOLIC BLOOD PRESSURE: 115 MMHG | BODY MASS INDEX: 1.88 KG/M2 | WEIGHT: 12 LBS | RESPIRATION RATE: 17 BRPM | HEART RATE: 96 BPM | TEMPERATURE: 98 F | HEIGHT: 67 IN | DIASTOLIC BLOOD PRESSURE: 73 MMHG | OXYGEN SATURATION: 95 %

## 2023-12-15 DIAGNOSIS — E55.9 VITAMIN D DEFICIENCY: ICD-10-CM

## 2023-12-15 DIAGNOSIS — R56.9 SEIZURES (HCC): Primary | ICD-10-CM

## 2023-12-15 DIAGNOSIS — Z12.11 SCREEN FOR COLON CANCER: ICD-10-CM

## 2023-12-15 DIAGNOSIS — N39.42 URINARY INCONTINENCE WITHOUT SENSORY AWARENESS: ICD-10-CM

## 2023-12-15 DIAGNOSIS — Z11.59 ENCOUNTER FOR SCREENING FOR OTHER VIRAL DISEASES: ICD-10-CM

## 2023-12-15 DIAGNOSIS — R26.9 GAIT DISORDER: ICD-10-CM

## 2023-12-15 DIAGNOSIS — F79 INTELLECTUAL DISABILITY: ICD-10-CM

## 2023-12-15 DIAGNOSIS — H55.00 NYSTAGMUS: ICD-10-CM

## 2023-12-15 DIAGNOSIS — F20.9 SCHIZOPHRENIA, UNSPECIFIED TYPE (HCC): ICD-10-CM

## 2023-12-15 PROCEDURE — 99214 OFFICE O/P EST MOD 30 MIN: CPT | Performed by: INTERNAL MEDICINE

## 2023-12-15 RX ORDER — HYDROXYZINE 50 MG/1
50 TABLET, FILM COATED ORAL 2 TIMES DAILY
COMMUNITY

## 2023-12-15 ASSESSMENT — PATIENT HEALTH QUESTIONNAIRE - PHQ9
SUM OF ALL RESPONSES TO PHQ QUESTIONS 1-9: 0
2. FEELING DOWN, DEPRESSED OR HOPELESS: 0
SUM OF ALL RESPONSES TO PHQ9 QUESTIONS 1 & 2: 0
SUM OF ALL RESPONSES TO PHQ QUESTIONS 1-9: 0
1. LITTLE INTEREST OR PLEASURE IN DOING THINGS: 0

## 2023-12-16 LAB
25(OH)D3+25(OH)D2 SERPL-MCNC: 8.8 NG/ML (ref 30–100)
BASOPHILS # BLD AUTO: 0 X10E3/UL (ref 0–0.2)
BASOPHILS NFR BLD AUTO: 0 %
BUN SERPL-MCNC: 10 MG/DL (ref 6–24)
BUN/CREAT SERPL: 16 (ref 9–23)
CALCIUM SERPL-MCNC: 9.4 MG/DL (ref 8.7–10.2)
CHLORIDE SERPL-SCNC: 108 MMOL/L (ref 96–106)
CO2 SERPL-SCNC: 22 MMOL/L (ref 20–29)
CREAT SERPL-MCNC: 0.63 MG/DL (ref 0.57–1)
EGFRCR SERPLBLD CKD-EPI 2021: 106 ML/MIN/1.73
EOSINOPHIL # BLD AUTO: 0.1 X10E3/UL (ref 0–0.4)
EOSINOPHIL NFR BLD AUTO: 1 %
ERYTHROCYTE [DISTWIDTH] IN BLOOD BY AUTOMATED COUNT: 11.8 % (ref 11.7–15.4)
GLUCOSE SERPL-MCNC: 80 MG/DL (ref 70–99)
HBV CORE AB SERPL QL IA: NEGATIVE
HBV SURFACE AB SER QL: NON REACTIVE
HBV SURFACE AG SERPL QL IA: NEGATIVE
HCT VFR BLD AUTO: 40.8 % (ref 34–46.6)
HGB BLD-MCNC: 13.9 G/DL (ref 11.1–15.9)
HIV 1+2 AB+HIV1 P24 AG SERPL QL IA: NON REACTIVE
IMM GRANULOCYTES # BLD AUTO: 0 X10E3/UL (ref 0–0.1)
IMM GRANULOCYTES NFR BLD AUTO: 0 %
LYMPHOCYTES # BLD AUTO: 1.8 X10E3/UL (ref 0.7–3.1)
LYMPHOCYTES NFR BLD AUTO: 30 %
MCH RBC QN AUTO: 30.6 PG (ref 26.6–33)
MCHC RBC AUTO-ENTMCNC: 34.1 G/DL (ref 31.5–35.7)
MCV RBC AUTO: 90 FL (ref 79–97)
MONOCYTES # BLD AUTO: 0.7 X10E3/UL (ref 0.1–0.9)
MONOCYTES NFR BLD AUTO: 12 %
NEUTROPHILS # BLD AUTO: 3.3 X10E3/UL (ref 1.4–7)
NEUTROPHILS NFR BLD AUTO: 57 %
PLATELET # BLD AUTO: 219 X10E3/UL (ref 150–450)
POTASSIUM SERPL-SCNC: 4.1 MMOL/L (ref 3.5–5.2)
RBC # BLD AUTO: 4.54 X10E6/UL (ref 3.77–5.28)
SODIUM SERPL-SCNC: 142 MMOL/L (ref 134–144)
SPECIMEN STATUS REPORT: NORMAL
WBC # BLD AUTO: 5.8 X10E3/UL (ref 3.4–10.8)

## 2023-12-16 RX ORDER — ERGOCALCIFEROL 1.25 MG/1
50000 CAPSULE ORAL WEEKLY
Qty: 12 CAPSULE | Refills: 1 | Status: SHIPPED | OUTPATIENT
Start: 2023-12-16

## 2024-01-10 LAB — NONINV COLON CA DNA+OCC BLD SCRN STL QL: NEGATIVE

## 2024-04-05 ENCOUNTER — OFFICE VISIT (OUTPATIENT)
Facility: CLINIC | Age: 54
End: 2024-04-05
Payer: MEDICARE

## 2024-04-05 VITALS
DIASTOLIC BLOOD PRESSURE: 68 MMHG | TEMPERATURE: 97.4 F | OXYGEN SATURATION: 94 % | WEIGHT: 121.9 LBS | BODY MASS INDEX: 19.13 KG/M2 | RESPIRATION RATE: 18 BRPM | HEART RATE: 92 BPM | HEIGHT: 67 IN | SYSTOLIC BLOOD PRESSURE: 112 MMHG

## 2024-04-05 DIAGNOSIS — Z81.8 FH: MENTAL ILLNESS: ICD-10-CM

## 2024-04-05 DIAGNOSIS — R56.9 SEIZURES (HCC): ICD-10-CM

## 2024-04-05 DIAGNOSIS — N39.42 URINARY INCONTINENCE WITHOUT SENSORY AWARENESS: ICD-10-CM

## 2024-04-05 DIAGNOSIS — Z00.00 MEDICARE ANNUAL WELLNESS VISIT, SUBSEQUENT: Primary | ICD-10-CM

## 2024-04-05 DIAGNOSIS — R26.9 GAIT DISORDER: ICD-10-CM

## 2024-04-05 DIAGNOSIS — E78.5 DYSLIPIDEMIA: ICD-10-CM

## 2024-04-05 DIAGNOSIS — F20.9 SCHIZOPHRENIA, UNSPECIFIED TYPE (HCC): ICD-10-CM

## 2024-04-05 DIAGNOSIS — F79 INTELLECTUAL DISABILITY: ICD-10-CM

## 2024-04-05 DIAGNOSIS — R15.9 FULL INCONTINENCE OF FECES: ICD-10-CM

## 2024-04-05 PROCEDURE — G0439 PPPS, SUBSEQ VISIT: HCPCS | Performed by: INTERNAL MEDICINE

## 2024-04-05 PROCEDURE — 36415 COLL VENOUS BLD VENIPUNCTURE: CPT | Performed by: INTERNAL MEDICINE

## 2024-04-05 ASSESSMENT — PATIENT HEALTH QUESTIONNAIRE - PHQ9
SUM OF ALL RESPONSES TO PHQ QUESTIONS 1-9: 0
SUM OF ALL RESPONSES TO PHQ QUESTIONS 1-9: 0
SUM OF ALL RESPONSES TO PHQ9 QUESTIONS 1 & 2: 0
SUM OF ALL RESPONSES TO PHQ QUESTIONS 1-9: 0
SUM OF ALL RESPONSES TO PHQ9 QUESTIONS 1 & 2: 0
SUM OF ALL RESPONSES TO PHQ QUESTIONS 1-9: 0
SUM OF ALL RESPONSES TO PHQ QUESTIONS 1-9: 0
1. LITTLE INTEREST OR PLEASURE IN DOING THINGS: NOT AT ALL
2. FEELING DOWN, DEPRESSED OR HOPELESS: NOT AT ALL
SUM OF ALL RESPONSES TO PHQ QUESTIONS 1-9: 0
1. LITTLE INTEREST OR PLEASURE IN DOING THINGS: NOT AT ALL
2. FEELING DOWN, DEPRESSED OR HOPELESS: NOT AT ALL
SUM OF ALL RESPONSES TO PHQ QUESTIONS 1-9: 0
SUM OF ALL RESPONSES TO PHQ QUESTIONS 1-9: 0

## 2024-04-05 ASSESSMENT — LIFESTYLE VARIABLES
HOW MANY STANDARD DRINKS CONTAINING ALCOHOL DO YOU HAVE ON A TYPICAL DAY: PATIENT DOES NOT DRINK
HOW OFTEN DO YOU HAVE A DRINK CONTAINING ALCOHOL: NEVER

## 2024-04-05 NOTE — PROGRESS NOTES
Medicare Annual Wellness Visit    Vibha Lee is here for Medicare AWV    Assessment & Plan   Medicare annual wellness visit, subsequent  Recommendations for Preventive Services Due: see orders and patient instructions/AVS.  Recommended screening schedule for the next 5-10 years is provided to the patient in written form: see Patient Instructions/AVS.     No follow-ups on file.     Subjective       Patient's complete Health Risk Assessment and screening values have been reviewed and are found in Flowsheets. The following problems were reviewed today and where indicated follow up appointments were made and/or referrals ordered.    Positive Risk Factor Screenings with Interventions:                Activity, Diet, and Weight:  On average, how many days per week do you engage in moderate to strenuous exercise (like a brisk walk)?: 0 days  On average, how many minutes do you engage in exercise at this level?: 0 min    Do you eat balanced/healthy meals regularly?: Yes    Body mass index is 19.09 kg/m².      Inactivity Interventions:  See A/P for plan and any pertinent orders          Vision Screen:  Do you have difficulty driving, watching TV, or doing any of your daily activities because of your eyesight?: No  Have you had an eye exam within the past year?: (!) No  No results found.    Interventions:   See A/P for any pertinent orders     ADL's:   Patient reports needing help with:  Select all that apply: (!) Dressing, Grooming  Select all that apply: (!) Laundry, Housekeeping, Banking/Finances, Shopping, Food Preparation, Transportation, Taking Medications  Interventions:  See A/P for plan and any pertinent orders    Advanced Directives:  Do you have a Living Will?: (!) No    Intervention:  has NO advanced directive - information provided                     Objective   Vitals:    04/05/24 1017   BP: 112/68   Site: Left Upper Arm   Position: Sitting   Pulse: 92   Resp: 18   Temp: 97.4 °F (36.3 °C)   TempSrc: Oral

## 2024-04-05 NOTE — PROGRESS NOTES
Chief Complaint   Patient presents with    Medicare AWV     \"Have you been to the ER, urgent care clinic since your last visit?  Hospitalized since your last visit?\"    NO    “Have you seen or consulted any other health care providers outside of [facility] since your last visit?”    NO    Have you had a mammogram?”   NO    No breast cancer screening on file      “Have you had a pap smear?”    NO    No cervical cancer screening on file             Click Here for Release of Records Request

## 2024-04-06 LAB
ALBUMIN SERPL-MCNC: 4.5 G/DL (ref 3.8–4.9)
ALBUMIN/GLOB SERPL: 1.4 {RATIO} (ref 1.2–2.2)
ALP SERPL-CCNC: 72 IU/L (ref 44–121)
ALT SERPL-CCNC: 16 IU/L (ref 0–32)
APPEARANCE UR: CLEAR
AST SERPL-CCNC: 16 IU/L (ref 0–40)
BACTERIA #/AREA URNS HPF: NORMAL /[HPF]
BASOPHILS # BLD AUTO: 0.1 X10E3/UL (ref 0–0.2)
BASOPHILS NFR BLD AUTO: 1 %
BILIRUB SERPL-MCNC: 0.7 MG/DL (ref 0–1.2)
BILIRUB UR QL STRIP: NEGATIVE
BUN SERPL-MCNC: 13 MG/DL (ref 6–24)
BUN/CREAT SERPL: 17 (ref 9–23)
CALCIUM SERPL-MCNC: 9.7 MG/DL (ref 8.7–10.2)
CASTS URNS QL MICRO: NORMAL /LPF
CHLORIDE SERPL-SCNC: 105 MMOL/L (ref 96–106)
CHOLEST SERPL-MCNC: 204 MG/DL (ref 100–199)
CO2 SERPL-SCNC: 22 MMOL/L (ref 20–29)
COLOR UR: YELLOW
CREAT SERPL-MCNC: 0.77 MG/DL (ref 0.57–1)
EGFRCR SERPLBLD CKD-EPI 2021: 92 ML/MIN/1.73
EOSINOPHIL # BLD AUTO: 0.1 X10E3/UL (ref 0–0.4)
EOSINOPHIL NFR BLD AUTO: 1 %
EPI CELLS #/AREA URNS HPF: NORMAL /HPF (ref 0–10)
ERYTHROCYTE [DISTWIDTH] IN BLOOD BY AUTOMATED COUNT: 12.6 % (ref 11.7–15.4)
GLOBULIN SER CALC-MCNC: 3.2 G/DL (ref 1.5–4.5)
GLUCOSE SERPL-MCNC: 93 MG/DL (ref 70–99)
GLUCOSE UR QL STRIP: NEGATIVE
HCT VFR BLD AUTO: 43.1 % (ref 34–46.6)
HDLC SERPL-MCNC: 44 MG/DL
HGB BLD-MCNC: 14.3 G/DL (ref 11.1–15.9)
HGB UR QL STRIP: NEGATIVE
IMM GRANULOCYTES # BLD AUTO: 0 X10E3/UL (ref 0–0.1)
IMM GRANULOCYTES NFR BLD AUTO: 0 %
KETONES UR QL STRIP: NEGATIVE
LDLC SERPL CALC-MCNC: 142 MG/DL (ref 0–99)
LEUKOCYTE ESTERASE UR QL STRIP: NEGATIVE
LYMPHOCYTES # BLD AUTO: 2.7 X10E3/UL (ref 0.7–3.1)
LYMPHOCYTES NFR BLD AUTO: 39 %
MCH RBC QN AUTO: 30.5 PG (ref 26.6–33)
MCHC RBC AUTO-ENTMCNC: 33.2 G/DL (ref 31.5–35.7)
MCV RBC AUTO: 92 FL (ref 79–97)
MICRO URNS: NORMAL
MICRO URNS: NORMAL
MONOCYTES # BLD AUTO: 0.7 X10E3/UL (ref 0.1–0.9)
MONOCYTES NFR BLD AUTO: 10 %
NEUTROPHILS # BLD AUTO: 3.3 X10E3/UL (ref 1.4–7)
NEUTROPHILS NFR BLD AUTO: 49 %
NITRITE UR QL STRIP: NEGATIVE
PH UR STRIP: 5 [PH] (ref 5–7.5)
PLATELET # BLD AUTO: 260 X10E3/UL (ref 150–450)
POTASSIUM SERPL-SCNC: 4 MMOL/L (ref 3.5–5.2)
PROT SERPL-MCNC: 7.7 G/DL (ref 6–8.5)
PROT UR QL STRIP: NEGATIVE
RBC # BLD AUTO: 4.69 X10E6/UL (ref 3.77–5.28)
RBC #/AREA URNS HPF: NORMAL /HPF (ref 0–2)
SODIUM SERPL-SCNC: 142 MMOL/L (ref 134–144)
SP GR UR STRIP: 1.02 (ref 1–1.03)
TRIGL SERPL-MCNC: 101 MG/DL (ref 0–149)
TSH SERPL DL<=0.005 MIU/L-ACNC: 0.66 UIU/ML (ref 0.45–4.5)
UROBILINOGEN UR STRIP-MCNC: 0.2 MG/DL (ref 0.2–1)
VLDLC SERPL CALC-MCNC: 18 MG/DL (ref 5–40)
WBC # BLD AUTO: 6.8 X10E3/UL (ref 3.4–10.8)
WBC #/AREA URNS HPF: NORMAL /HPF (ref 0–5)

## 2025-08-23 ENCOUNTER — APPOINTMENT (OUTPATIENT)
Facility: HOSPITAL | Age: 55
End: 2025-08-23
Payer: MEDICAID

## 2025-08-23 ENCOUNTER — HOSPITAL ENCOUNTER (EMERGENCY)
Facility: HOSPITAL | Age: 55
Discharge: PSYCHIATRIC OR SUBSTANCE USE FACILITY | End: 2025-08-28
Attending: EMERGENCY MEDICINE
Payer: MEDICAID

## 2025-08-23 DIAGNOSIS — R44.3 HALLUCINATIONS: Primary | ICD-10-CM

## 2025-08-23 LAB
ALBUMIN SERPL-MCNC: 3.8 G/DL (ref 3.5–5.2)
ALBUMIN/GLOB SERPL: 1.1 (ref 1.1–2.2)
ALP SERPL-CCNC: 62 U/L (ref 35–104)
ALT SERPL-CCNC: 16 U/L (ref 10–35)
AMPHET UR QL SCN: NEGATIVE
ANION GAP SERPL CALC-SCNC: 12 MMOL/L (ref 2–14)
APAP SERPL-MCNC: <5 UG/ML (ref 10–30)
APPEARANCE UR: CLEAR
AST SERPL-CCNC: 19 U/L (ref 10–35)
BACTERIA URNS QL MICRO: NEGATIVE /HPF
BARBITURATES UR QL SCN: NEGATIVE
BASOPHILS # BLD: 0.04 K/UL (ref 0–0.1)
BASOPHILS NFR BLD: 0.7 % (ref 0–1)
BENZODIAZ UR QL: NEGATIVE
BILIRUB SERPL-MCNC: 0.4 MG/DL (ref 0–1.2)
BILIRUB UR QL: NEGATIVE
BUN SERPL-MCNC: 6 MG/DL (ref 6–20)
BUN/CREAT SERPL: 9 (ref 12–20)
CALCIUM SERPL-MCNC: 9.7 MG/DL (ref 8.6–10)
CANNABINOIDS UR QL SCN: NEGATIVE
CHLORIDE SERPL-SCNC: 105 MMOL/L (ref 98–107)
CO2 SERPL-SCNC: 25 MMOL/L (ref 20–29)
COCAINE UR QL SCN: NEGATIVE
COLOR UR: ABNORMAL
COMMENT:: NORMAL
CREAT SERPL-MCNC: 0.65 MG/DL (ref 0.6–1)
DIFFERENTIAL METHOD BLD: NORMAL
EOSINOPHIL # BLD: 0.06 K/UL (ref 0–0.4)
EOSINOPHIL NFR BLD: 1.1 % (ref 0–7)
EPITH CASTS URNS QL MICRO: ABNORMAL /LPF
ERYTHROCYTE [DISTWIDTH] IN BLOOD BY AUTOMATED COUNT: 12.1 % (ref 11.5–14.5)
ETHANOL SERPL-MCNC: <11 MG/DL (ref 0–0.08)
FENTANYL: NEGATIVE
GLOBULIN SER CALC-MCNC: 3.6 G/DL (ref 2–4)
GLUCOSE SERPL-MCNC: 88 MG/DL (ref 65–100)
GLUCOSE UR STRIP.AUTO-MCNC: NEGATIVE MG/DL
HCT VFR BLD AUTO: 38.7 % (ref 35–47)
HGB BLD-MCNC: 12.5 G/DL (ref 11.5–16)
HGB UR QL STRIP: ABNORMAL
HYALINE CASTS URNS QL MICRO: ABNORMAL /LPF (ref 0–5)
IMM GRANULOCYTES # BLD AUTO: 0.01 K/UL (ref 0–0.04)
IMM GRANULOCYTES NFR BLD AUTO: 0.2 % (ref 0–0.5)
KETONES UR QL STRIP.AUTO: NEGATIVE MG/DL
LEUKOCYTE ESTERASE UR QL STRIP.AUTO: NEGATIVE
LYMPHOCYTES # BLD: 2.22 K/UL (ref 0.8–3.5)
LYMPHOCYTES NFR BLD: 40.4 % (ref 12–49)
Lab: NORMAL
MCH RBC QN AUTO: 30.7 PG (ref 26–34)
MCHC RBC AUTO-ENTMCNC: 32.3 G/DL (ref 30–36.5)
MCV RBC AUTO: 95.1 FL (ref 80–99)
METHADONE UR QL: NEGATIVE
MONOCYTES # BLD: 0.59 K/UL (ref 0–1)
MONOCYTES NFR BLD: 10.7 % (ref 5–13)
NEUTS SEG # BLD: 2.58 K/UL (ref 1.8–8)
NEUTS SEG NFR BLD: 46.9 % (ref 32–75)
NITRITE UR QL STRIP.AUTO: NEGATIVE
NRBC # BLD: 0 K/UL (ref 0–0.01)
NRBC BLD-RTO: 0 PER 100 WBC
OPIATES UR QL: NEGATIVE
PCP UR QL: NEGATIVE
PH UR STRIP: 7.5 (ref 5–8)
PLATELET # BLD AUTO: 255 K/UL (ref 150–400)
PMV BLD AUTO: 9.5 FL (ref 8.9–12.9)
POTASSIUM SERPL-SCNC: 4.5 MMOL/L (ref 3.5–5.1)
PROT SERPL-MCNC: 7.3 G/DL (ref 6.4–8.3)
PROT UR STRIP-MCNC: NEGATIVE MG/DL
RBC # BLD AUTO: 4.07 M/UL (ref 3.8–5.2)
RBC #/AREA URNS HPF: ABNORMAL /HPF (ref 0–5)
SALICYLATES SERPL-MCNC: <0.5 MG/DL (ref 3–10)
SODIUM SERPL-SCNC: 141 MMOL/L (ref 136–145)
SP GR UR REFRACTOMETRY: <1.005 (ref 1–1.03)
SPECIMEN HOLD: NORMAL
SPECIMEN HOLD: NORMAL
UROBILINOGEN UR QL STRIP.AUTO: 0.2 EU/DL (ref 0.2–1)
WBC # BLD AUTO: 5.5 K/UL (ref 3.6–11)
WBC URNS QL MICRO: ABNORMAL /HPF (ref 0–4)

## 2025-08-23 PROCEDURE — 85025 COMPLETE CBC W/AUTO DIFF WBC: CPT

## 2025-08-23 PROCEDURE — 81001 URINALYSIS AUTO W/SCOPE: CPT

## 2025-08-23 PROCEDURE — 90792 PSYCH DIAG EVAL W/MED SRVCS: CPT

## 2025-08-23 PROCEDURE — 6360000002 HC RX W HCPCS

## 2025-08-23 PROCEDURE — 96372 THER/PROPH/DIAG INJ SC/IM: CPT

## 2025-08-23 PROCEDURE — 82077 ASSAY SPEC XCP UR&BREATH IA: CPT

## 2025-08-23 PROCEDURE — 80179 DRUG ASSAY SALICYLATE: CPT

## 2025-08-23 PROCEDURE — 80053 COMPREHEN METABOLIC PANEL: CPT

## 2025-08-23 PROCEDURE — 80143 DRUG ASSAY ACETAMINOPHEN: CPT

## 2025-08-23 PROCEDURE — 2500000003 HC RX 250 WO HCPCS

## 2025-08-23 PROCEDURE — 99285 EMERGENCY DEPT VISIT HI MDM: CPT

## 2025-08-23 PROCEDURE — 6370000000 HC RX 637 (ALT 250 FOR IP)

## 2025-08-23 RX ORDER — HYDROXYZINE HYDROCHLORIDE 25 MG/1
50 TABLET, FILM COATED ORAL ONCE
Status: COMPLETED | OUTPATIENT
Start: 2025-08-23 | End: 2025-08-23

## 2025-08-23 RX ADMIN — WATER 5 MG: 1 INJECTION INTRAMUSCULAR; INTRAVENOUS; SUBCUTANEOUS at 17:07

## 2025-08-23 RX ADMIN — HYDROXYZINE HYDROCHLORIDE 50 MG: 25 TABLET ORAL at 16:50

## 2025-08-23 ASSESSMENT — PAIN SCALES - GENERAL: PAINLEVEL_OUTOF10: 0

## 2025-08-24 ENCOUNTER — APPOINTMENT (OUTPATIENT)
Facility: HOSPITAL | Age: 55
End: 2025-08-24
Payer: MEDICAID

## 2025-08-24 LAB
EKG ATRIAL RATE: 107 BPM
EKG DIAGNOSIS: NORMAL
EKG P AXIS: 64 DEGREES
EKG P-R INTERVAL: 120 MS
EKG Q-T INTERVAL: 330 MS
EKG QRS DURATION: 92 MS
EKG QTC CALCULATION (BAZETT): 440 MS
EKG R AXIS: 89 DEGREES
EKG T AXIS: 51 DEGREES
EKG VENTRICULAR RATE: 107 BPM

## 2025-08-24 PROCEDURE — 90792 PSYCH DIAG EVAL W/MED SRVCS: CPT | Performed by: NURSE PRACTITIONER

## 2025-08-24 PROCEDURE — 6370000000 HC RX 637 (ALT 250 FOR IP): Performed by: EMERGENCY MEDICINE

## 2025-08-24 PROCEDURE — 2500000003 HC RX 250 WO HCPCS: Performed by: STUDENT IN AN ORGANIZED HEALTH CARE EDUCATION/TRAINING PROGRAM

## 2025-08-24 PROCEDURE — 6360000002 HC RX W HCPCS

## 2025-08-24 PROCEDURE — 93005 ELECTROCARDIOGRAM TRACING: CPT | Performed by: EMERGENCY MEDICINE

## 2025-08-24 PROCEDURE — 96372 THER/PROPH/DIAG INJ SC/IM: CPT

## 2025-08-24 PROCEDURE — 6360000002 HC RX W HCPCS: Performed by: STUDENT IN AN ORGANIZED HEALTH CARE EDUCATION/TRAINING PROGRAM

## 2025-08-24 RX ORDER — HYDROXYZINE HYDROCHLORIDE 25 MG/1
50 TABLET, FILM COATED ORAL 3 TIMES DAILY PRN
Status: DISCONTINUED | OUTPATIENT
Start: 2025-08-24 | End: 2025-08-28 | Stop reason: HOSPADM

## 2025-08-24 RX ORDER — MIDAZOLAM HYDROCHLORIDE 5 MG/5ML
5 INJECTION, SOLUTION INTRAMUSCULAR; INTRAVENOUS ONCE
Status: COMPLETED | OUTPATIENT
Start: 2025-08-24 | End: 2025-08-24

## 2025-08-24 RX ORDER — LORAZEPAM 1 MG/1
2 TABLET ORAL EVERY 6 HOURS PRN
Status: DISCONTINUED | OUTPATIENT
Start: 2025-08-24 | End: 2025-08-28 | Stop reason: HOSPADM

## 2025-08-24 RX ORDER — ARIPIPRAZOLE 10 MG/1
10 TABLET ORAL DAILY
Status: DISCONTINUED | OUTPATIENT
Start: 2025-08-24 | End: 2025-08-25

## 2025-08-24 RX ORDER — DIPHENHYDRAMINE HYDROCHLORIDE 50 MG/ML
25 INJECTION, SOLUTION INTRAMUSCULAR; INTRAVENOUS
Status: COMPLETED | OUTPATIENT
Start: 2025-08-24 | End: 2025-08-24

## 2025-08-24 RX ORDER — TRAZODONE HYDROCHLORIDE 50 MG/1
100 TABLET ORAL NIGHTLY
Status: DISCONTINUED | OUTPATIENT
Start: 2025-08-24 | End: 2025-08-28 | Stop reason: HOSPADM

## 2025-08-24 RX ORDER — HALOPERIDOL 5 MG/1
5 TABLET ORAL 4 TIMES DAILY PRN
Status: DISCONTINUED | OUTPATIENT
Start: 2025-08-24 | End: 2025-08-28 | Stop reason: HOSPADM

## 2025-08-24 RX ORDER — MIDAZOLAM HYDROCHLORIDE 1 MG/ML
INJECTION, SOLUTION INTRAMUSCULAR; INTRAVENOUS
Status: COMPLETED
Start: 2025-08-24 | End: 2025-08-24

## 2025-08-24 RX ORDER — TOPIRAMATE 25 MG/1
50 TABLET, FILM COATED ORAL DAILY
Status: DISCONTINUED | OUTPATIENT
Start: 2025-08-24 | End: 2025-08-28 | Stop reason: HOSPADM

## 2025-08-24 RX ORDER — HALOPERIDOL 5 MG/1
5 TABLET ORAL ONCE
Status: COMPLETED | OUTPATIENT
Start: 2025-08-24 | End: 2025-08-24

## 2025-08-24 RX ORDER — PROPRANOLOL HYDROCHLORIDE 10 MG/1
5 TABLET ORAL 2 TIMES DAILY
Status: DISCONTINUED | OUTPATIENT
Start: 2025-08-24 | End: 2025-08-28 | Stop reason: HOSPADM

## 2025-08-24 RX ADMIN — MIDAZOLAM 5 MG: 1 INJECTION INTRAMUSCULAR; INTRAVENOUS at 08:25

## 2025-08-24 RX ADMIN — DIPHENHYDRAMINE HYDROCHLORIDE 25 MG: 50 INJECTION INTRAMUSCULAR; INTRAVENOUS at 01:15

## 2025-08-24 RX ADMIN — HALOPERIDOL 5 MG: 5 TABLET ORAL at 19:06

## 2025-08-24 RX ADMIN — LORAZEPAM 2 MG: 1 TABLET ORAL at 18:58

## 2025-08-24 RX ADMIN — ARIPIPRAZOLE 10 MG: 10 TABLET ORAL at 08:30

## 2025-08-24 RX ADMIN — HALOPERIDOL 5 MG: 5 TABLET ORAL at 13:03

## 2025-08-24 RX ADMIN — HYDROXYZINE HYDROCHLORIDE 50 MG: 25 TABLET ORAL at 18:58

## 2025-08-24 RX ADMIN — HYDROXYZINE HYDROCHLORIDE 50 MG: 25 TABLET ORAL at 08:04

## 2025-08-24 RX ADMIN — TOPIRAMATE 50 MG: 25 TABLET, FILM COATED ORAL at 08:30

## 2025-08-24 RX ADMIN — ZIPRASIDONE MESYLATE 20 MG: 20 INJECTION, POWDER, LYOPHILIZED, FOR SOLUTION INTRAMUSCULAR at 01:15

## 2025-08-24 RX ADMIN — MIDAZOLAM HYDROCHLORIDE 5 MG: 5 INJECTION, SOLUTION INTRAMUSCULAR; INTRAVENOUS at 08:25

## 2025-08-24 RX ADMIN — MIDAZOLAM HYDROCHLORIDE 2.5 MG: 1 INJECTION, SOLUTION INTRAMUSCULAR; INTRAVENOUS at 04:49

## 2025-08-25 LAB
EKG ATRIAL RATE: 123 BPM
EKG DIAGNOSIS: NORMAL
EKG P AXIS: 83 DEGREES
EKG P-R INTERVAL: 118 MS
EKG Q-T INTERVAL: 322 MS
EKG QRS DURATION: 78 MS
EKG QTC CALCULATION (BAZETT): 460 MS
EKG R AXIS: 88 DEGREES
EKG T AXIS: 83 DEGREES
EKG VENTRICULAR RATE: 123 BPM

## 2025-08-25 PROCEDURE — 6360000002 HC RX W HCPCS

## 2025-08-25 PROCEDURE — 99214 OFFICE O/P EST MOD 30 MIN: CPT | Performed by: NURSE PRACTITIONER

## 2025-08-25 PROCEDURE — 96372 THER/PROPH/DIAG INJ SC/IM: CPT

## 2025-08-25 PROCEDURE — 6370000000 HC RX 637 (ALT 250 FOR IP): Performed by: EMERGENCY MEDICINE

## 2025-08-25 PROCEDURE — 6360000002 HC RX W HCPCS: Performed by: EMERGENCY MEDICINE

## 2025-08-25 PROCEDURE — 93005 ELECTROCARDIOGRAM TRACING: CPT | Performed by: EMERGENCY MEDICINE

## 2025-08-25 PROCEDURE — 2500000003 HC RX 250 WO HCPCS

## 2025-08-25 RX ORDER — OLANZAPINE 5 MG/1
5 TABLET, FILM COATED ORAL ONCE
Status: COMPLETED | OUTPATIENT
Start: 2025-08-25 | End: 2025-08-25

## 2025-08-25 RX ORDER — DIAZEPAM 5 MG/1
5 TABLET ORAL ONCE
Status: COMPLETED | OUTPATIENT
Start: 2025-08-25 | End: 2025-08-25

## 2025-08-25 RX ORDER — HALOPERIDOL 5 MG/ML
5 INJECTION INTRAMUSCULAR ONCE
Status: COMPLETED | OUTPATIENT
Start: 2025-08-25 | End: 2025-08-25

## 2025-08-25 RX ADMIN — TRAZODONE HYDROCHLORIDE 100 MG: 50 TABLET ORAL at 20:47

## 2025-08-25 RX ADMIN — LORAZEPAM 2 MG: 1 TABLET ORAL at 23:56

## 2025-08-25 RX ADMIN — HALOPERIDOL LACTATE 5 MG: 5 INJECTION, SOLUTION INTRAMUSCULAR at 16:11

## 2025-08-25 RX ADMIN — PROPRANOLOL HYDROCHLORIDE 5 MG: 10 TABLET ORAL at 20:47

## 2025-08-25 RX ADMIN — ARIPIPRAZOLE 10 MG: 10 TABLET ORAL at 09:20

## 2025-08-25 RX ADMIN — TOPIRAMATE 50 MG: 25 TABLET, FILM COATED ORAL at 09:20

## 2025-08-25 RX ADMIN — PROPRANOLOL HYDROCHLORIDE 5 MG: 10 TABLET ORAL at 09:53

## 2025-08-25 RX ADMIN — WATER 5 MG: 1 INJECTION INTRAMUSCULAR; INTRAVENOUS; SUBCUTANEOUS at 18:58

## 2025-08-25 RX ADMIN — OLANZAPINE 5 MG: 5 TABLET, FILM COATED ORAL at 09:19

## 2025-08-25 RX ADMIN — DIAZEPAM 5 MG: 5 TABLET ORAL at 09:20

## 2025-08-25 ASSESSMENT — PAIN SCALES - GENERAL: PAINLEVEL_OUTOF10: 0

## 2025-08-26 ENCOUNTER — APPOINTMENT (OUTPATIENT)
Facility: HOSPITAL | Age: 55
End: 2025-08-26
Payer: MEDICAID

## 2025-08-26 PROBLEM — F29 PSYCHOSIS (HCC): Status: ACTIVE | Noted: 2025-08-26

## 2025-08-26 LAB — HCG UR QL: NEGATIVE

## 2025-08-26 PROCEDURE — 81025 URINE PREGNANCY TEST: CPT

## 2025-08-26 PROCEDURE — 6370000000 HC RX 637 (ALT 250 FOR IP): Performed by: EMERGENCY MEDICINE

## 2025-08-26 PROCEDURE — 99214 OFFICE O/P EST MOD 30 MIN: CPT

## 2025-08-26 PROCEDURE — 70450 CT HEAD/BRAIN W/O DYE: CPT

## 2025-08-26 RX ADMIN — PROPRANOLOL HYDROCHLORIDE 5 MG: 10 TABLET ORAL at 09:15

## 2025-08-26 RX ADMIN — PROPRANOLOL HYDROCHLORIDE 5 MG: 10 TABLET ORAL at 21:27

## 2025-08-26 RX ADMIN — ARIPIPRAZOLE 15 MG: 5 TABLET ORAL at 09:15

## 2025-08-26 RX ADMIN — TOPIRAMATE 50 MG: 25 TABLET, FILM COATED ORAL at 09:15

## 2025-08-26 RX ADMIN — LORAZEPAM 2 MG: 1 TABLET ORAL at 14:34

## 2025-08-26 RX ADMIN — TRAZODONE HYDROCHLORIDE 100 MG: 50 TABLET ORAL at 21:27

## 2025-08-26 ASSESSMENT — PAIN SCALES - GENERAL
PAINLEVEL_OUTOF10: 0
PAINLEVEL_OUTOF10: 0

## 2025-08-27 PROCEDURE — 99213 OFFICE O/P EST LOW 20 MIN: CPT

## 2025-08-27 PROCEDURE — 6370000000 HC RX 637 (ALT 250 FOR IP): Performed by: EMERGENCY MEDICINE

## 2025-08-27 RX ADMIN — PROPRANOLOL HYDROCHLORIDE 5 MG: 10 TABLET ORAL at 10:18

## 2025-08-27 RX ADMIN — TOPIRAMATE 50 MG: 25 TABLET, FILM COATED ORAL at 10:17

## 2025-08-27 RX ADMIN — ARIPIPRAZOLE 15 MG: 5 TABLET ORAL at 10:17

## 2025-08-27 RX ADMIN — HALOPERIDOL 5 MG: 5 TABLET ORAL at 21:59

## 2025-08-27 RX ADMIN — PROPRANOLOL HYDROCHLORIDE 5 MG: 10 TABLET ORAL at 21:59

## 2025-08-27 RX ADMIN — TRAZODONE HYDROCHLORIDE 100 MG: 50 TABLET ORAL at 21:59

## 2025-08-27 RX ADMIN — LORAZEPAM 2 MG: 1 TABLET ORAL at 11:32

## 2025-08-27 ASSESSMENT — PAIN SCALES - GENERAL
PAINLEVEL_OUTOF10: 0
PAINLEVEL_OUTOF10: 0

## 2025-08-28 VITALS
BODY MASS INDEX: 16.96 KG/M2 | WEIGHT: 108.03 LBS | SYSTOLIC BLOOD PRESSURE: 122 MMHG | RESPIRATION RATE: 17 BRPM | DIASTOLIC BLOOD PRESSURE: 69 MMHG | TEMPERATURE: 99 F | HEART RATE: 78 BPM | HEIGHT: 67 IN | OXYGEN SATURATION: 98 %

## 2025-08-28 LAB
EKG ATRIAL RATE: 95 BPM
EKG DIAGNOSIS: NORMAL
EKG P AXIS: 84 DEGREES
EKG P-R INTERVAL: 124 MS
EKG Q-T INTERVAL: 352 MS
EKG QRS DURATION: 78 MS
EKG QTC CALCULATION (BAZETT): 442 MS
EKG R AXIS: 92 DEGREES
EKG T AXIS: 79 DEGREES
EKG VENTRICULAR RATE: 95 BPM

## 2025-08-28 PROCEDURE — 6370000000 HC RX 637 (ALT 250 FOR IP): Performed by: EMERGENCY MEDICINE

## 2025-08-28 PROCEDURE — 93005 ELECTROCARDIOGRAM TRACING: CPT | Performed by: STUDENT IN AN ORGANIZED HEALTH CARE EDUCATION/TRAINING PROGRAM

## 2025-08-28 PROCEDURE — 99212 OFFICE O/P EST SF 10 MIN: CPT

## 2025-08-28 RX ADMIN — TOPIRAMATE 50 MG: 25 TABLET, FILM COATED ORAL at 09:58

## 2025-08-28 RX ADMIN — ARIPIPRAZOLE 15 MG: 5 TABLET ORAL at 09:58

## 2025-08-28 RX ADMIN — PROPRANOLOL HYDROCHLORIDE 5 MG: 10 TABLET ORAL at 09:58

## 2025-08-28 RX ADMIN — LORAZEPAM 2 MG: 1 TABLET ORAL at 11:37

## 2025-08-28 ASSESSMENT — PAIN SCALES - GENERAL: PAINLEVEL_OUTOF10: 0
